# Patient Record
Sex: FEMALE | Race: BLACK OR AFRICAN AMERICAN | NOT HISPANIC OR LATINO | Employment: OTHER | URBAN - METROPOLITAN AREA
[De-identification: names, ages, dates, MRNs, and addresses within clinical notes are randomized per-mention and may not be internally consistent; named-entity substitution may affect disease eponyms.]

---

## 2018-08-22 ENCOUNTER — HISTORICAL (OUTPATIENT)
Dept: LAB | Facility: HOSPITAL | Age: 53
End: 2018-08-22

## 2018-11-06 ENCOUNTER — HISTORICAL (OUTPATIENT)
Dept: RADIOLOGY | Facility: HOSPITAL | Age: 53
End: 2018-11-06

## 2018-11-13 ENCOUNTER — HISTORICAL (OUTPATIENT)
Dept: RADIOLOGY | Facility: HOSPITAL | Age: 53
End: 2018-11-13

## 2018-11-15 ENCOUNTER — HISTORICAL (OUTPATIENT)
Dept: RADIOLOGY | Facility: HOSPITAL | Age: 53
End: 2018-11-15

## 2018-11-26 ENCOUNTER — HISTORICAL (OUTPATIENT)
Dept: LAB | Facility: HOSPITAL | Age: 53
End: 2018-11-26

## 2018-11-26 LAB — CEA SERPL-MCNC: 0.8 NG/ML (ref 0–3)

## 2018-12-07 LAB
ABS NEUT (OLG): 2 X10(3)/MCL (ref 1.5–6.9)
ALBUMIN SERPL-MCNC: 3.7 GM/DL (ref 3.4–5)
ALBUMIN/GLOB SERPL: 0.9 RATIO
ALP SERPL-CCNC: 74 UNIT/L (ref 30–113)
ALT SERPL-CCNC: 22 UNIT/L (ref 10–45)
APTT PPP: 31.1 SECOND(S) (ref 25–35)
AST SERPL-CCNC: 12 UNIT/L (ref 15–37)
BASOPHILS # BLD AUTO: 0 X10(3)/MCL (ref 0–0.1)
BASOPHILS NFR BLD AUTO: 1 % (ref 0–1)
BILIRUB SERPL-MCNC: 0.5 MG/DL (ref 0.1–0.9)
BILIRUBIN DIRECT+TOT PNL SERPL-MCNC: 0.1 MG/DL (ref 0–0.3)
BILIRUBIN DIRECT+TOT PNL SERPL-MCNC: 0.4 MG/DL
BUN SERPL-MCNC: 21 MG/DL (ref 10–20)
CALCIUM SERPL-MCNC: 9.3 MG/DL (ref 8–10.5)
CHLORIDE SERPL-SCNC: 105 MMOL/L (ref 100–108)
CO2 SERPL-SCNC: 33 MMOL/L (ref 21–35)
COLOR STL: NORMAL
CONSISTENCY STL: NORMAL
CREAT SERPL-MCNC: 0.68 MG/DL (ref 0.7–1.3)
EOSINOPHIL # BLD AUTO: 0.1 X10(3)/MCL (ref 0–0.6)
EOSINOPHIL NFR BLD AUTO: 3 % (ref 0–5)
ERYTHROCYTE [DISTWIDTH] IN BLOOD BY AUTOMATED COUNT: 13.4 % (ref 11.5–17)
GLOBULIN SER-MCNC: 4.2 GM/DL
GLUCOSE SERPL-MCNC: 88 MG/DL (ref 75–116)
HCT VFR BLD AUTO: 38.6 % (ref 36–48)
HEMOCCULT SP1 STL QL: NEGATIVE
HEMOCCULT SP2 STL QL: NEGATIVE
HGB BLD-MCNC: 12 GM/DL (ref 12–16)
INR PPP: 1 (ref 0–1.2)
LYMPHOCYTES # BLD AUTO: 1.7 X10(3)/MCL (ref 0.5–4.1)
LYMPHOCYTES NFR BLD AUTO: 40 % (ref 15–40)
MCH RBC QN AUTO: 28 PG (ref 27–34)
MCHC RBC AUTO-ENTMCNC: 31 GM/DL (ref 31–36)
MCV RBC AUTO: 90 FL (ref 80–99)
MONOCYTES # BLD AUTO: 0.4 X10(3)/MCL (ref 0–1.1)
MONOCYTES NFR BLD AUTO: 8 % (ref 4–12)
NEUTROPHILS # BLD AUTO: 2 X10(3)/MCL (ref 1.5–6.9)
NEUTROPHILS NFR BLD AUTO: 48 % (ref 43–75)
PLATELET # BLD AUTO: 239 X10(3)/MCL (ref 140–400)
PMV BLD AUTO: 11 FL (ref 6.8–10)
POTASSIUM SERPL-SCNC: 4.6 MMOL/L (ref 3.6–5.2)
PROT SERPL-MCNC: 7.9 GM/DL (ref 6.4–8.2)
PROTHROMBIN TIME: 10.3 SECOND(S) (ref 9–12)
RBC # BLD AUTO: 4.29 X10(6)/MCL (ref 4.2–5.4)
SODIUM SERPL-SCNC: 142 MMOL/L (ref 135–145)
WBC # SPEC AUTO: 4.3 X10(3)/MCL (ref 4.5–11.5)

## 2018-12-10 ENCOUNTER — HISTORICAL (OUTPATIENT)
Dept: ANESTHESIOLOGY | Facility: HOSPITAL | Age: 53
End: 2018-12-10

## 2019-09-22 LAB — HEMOCCULT SP1 STL QL: NEGATIVE

## 2020-06-08 ENCOUNTER — HISTORICAL (OUTPATIENT)
Dept: LAB | Facility: HOSPITAL | Age: 55
End: 2020-06-08

## 2020-06-08 LAB
ABS NEUT (OLG): 1 X10(3)/MCL (ref 1.5–6.9)
ALBUMIN SERPL-MCNC: 3.9 GM/DL (ref 3.5–5)
ALBUMIN/GLOB SERPL: 1 RATIO (ref 1.1–2)
ALP SERPL-CCNC: 66 UNIT/L (ref 40–150)
ALT SERPL-CCNC: 12 UNIT/L (ref 0–55)
AST SERPL-CCNC: 21 UNIT/L (ref 5–34)
BASOPHILS NFR BLD MANUAL: 0 % (ref 0–1)
BILIRUB SERPL-MCNC: 0.4 MG/DL
BILIRUBIN DIRECT+TOT PNL SERPL-MCNC: 0.2 MG/DL (ref 0–0.5)
BILIRUBIN DIRECT+TOT PNL SERPL-MCNC: 0.2 MG/DL (ref 0–0.8)
BUN SERPL-MCNC: 20 MG/DL (ref 9.8–20.1)
CALCIUM SERPL-MCNC: 9.3 MG/DL (ref 8.4–10.2)
CHLORIDE SERPL-SCNC: 104 MMOL/L (ref 98–107)
CHOLEST SERPL-MCNC: 138 MG/DL
CHOLEST/HDLC SERPL: 2 {RATIO} (ref 0–5)
CO2 SERPL-SCNC: 30 MMOL/L (ref 22–29)
CREAT SERPL-MCNC: 0.98 MG/DL (ref 0.55–1.02)
CRP SERPL-MCNC: 0.5 MG/DL
DEPRECATED CALCIDIOL+CALCIFEROL SERPL-MC: 39.4 NG/ML (ref 6.6–49.9)
EOSINOPHIL NFR BLD MANUAL: 0 % (ref 0–5)
ERYTHROCYTE [DISTWIDTH] IN BLOOD BY AUTOMATED COUNT: 12.9 % (ref 11.5–17)
GLOBULIN SER-MCNC: 3.8 GM/DL (ref 2.4–3.5)
GLUCOSE SERPL-MCNC: 107 MG/DL (ref 74–100)
GRANULOCYTES NFR BLD MANUAL: 32 % (ref 47–80)
HCT VFR BLD AUTO: 39.6 % (ref 36–48)
HDLC SERPL-MCNC: 56 MG/DL (ref 35–60)
HGB BLD-MCNC: 12.6 GM/DL (ref 12–16)
LDLC SERPL CALC-MCNC: 75 MG/DL (ref 50–140)
LYMPHOCYTES NFR BLD MANUAL: 52 % (ref 15–40)
MCH RBC QN AUTO: 28 PG (ref 27–34)
MCHC RBC AUTO-ENTMCNC: 32 GM/DL (ref 31–36)
MCV RBC AUTO: 88 FL (ref 80–99)
MONOCYTES NFR BLD MANUAL: 16 % (ref 4–12)
PLATELET # BLD AUTO: 189 X10(3)/MCL (ref 140–400)
PLATELET # BLD EST: ADEQUATE 10*3/UL
PMV BLD AUTO: 10 FL (ref 6.8–10)
POTASSIUM SERPL-SCNC: 3.5 MMOL/L (ref 3.5–5.1)
PROT SERPL-MCNC: 7.7 GM/DL (ref 6.4–8.3)
RBC # BLD AUTO: 4.48 X10(6)/MCL (ref 4.2–5.4)
RBC MORPH BLD: NORMAL
SODIUM SERPL-SCNC: 142 MMOL/L (ref 136–145)
TRIGL SERPL-MCNC: 35 MG/DL (ref 37–140)
TSH SERPL-ACNC: 3.71 UIU/ML (ref 0.35–4.94)
VLDLC SERPL CALC-MCNC: 7 MG/DL
WBC # SPEC AUTO: 2.9 X10(3)/MCL (ref 4.5–11.5)

## 2020-06-26 ENCOUNTER — HISTORICAL (OUTPATIENT)
Dept: LAB | Facility: HOSPITAL | Age: 55
End: 2020-06-26

## 2022-01-07 ENCOUNTER — HISTORICAL (OUTPATIENT)
Dept: RADIOLOGY | Facility: HOSPITAL | Age: 57
End: 2022-01-07

## 2022-01-07 LAB
ABS NEUT (OLG): 1.9 X10(3)/MCL (ref 1.5–6.9)
ALBUMIN SERPL-MCNC: 4 GM/DL (ref 3.5–5)
ALBUMIN/GLOB SERPL: 1.2 RATIO (ref 1.1–2)
ALP SERPL-CCNC: 67 UNIT/L (ref 40–150)
ALT SERPL-CCNC: 16 UNIT/L (ref 0–55)
APPEARANCE, UA: CLEAR
AST SERPL-CCNC: 15 UNIT/L (ref 5–34)
BILIRUB SERPL-MCNC: 0.6 MG/DL
BILIRUB UR QL STRIP: NEGATIVE
BILIRUBIN DIRECT+TOT PNL SERPL-MCNC: 0.3 MG/DL (ref 0–0.5)
BILIRUBIN DIRECT+TOT PNL SERPL-MCNC: 0.3 MG/DL (ref 0–0.8)
BUN SERPL-MCNC: 12 MG/DL (ref 9.8–20.1)
CALCIUM SERPL-MCNC: 9.9 MG/DL (ref 8.7–10.5)
CHLORIDE SERPL-SCNC: 105 MMOL/L (ref 98–107)
CHOLEST SERPL-MCNC: 145 MG/DL
CHOLEST/HDLC SERPL: 3 {RATIO} (ref 0–5)
CO2 SERPL-SCNC: 29 MMOL/L (ref 22–29)
COLOR UR: YELLOW
CREAT SERPL-MCNC: 0.81 MG/DL (ref 0.55–1.02)
DEPRECATED CALCIDIOL+CALCIFEROL SERPL-MC: 36.3 NG/ML (ref 30–80)
ERYTHROCYTE [DISTWIDTH] IN BLOOD BY AUTOMATED COUNT: 13.1 % (ref 11.5–17)
GLOBULIN SER-MCNC: 3.3 GM/DL (ref 2.4–3.5)
GLUCOSE (UA): NEGATIVE MG/DL
GLUCOSE SERPL-MCNC: 92 MG/DL (ref 74–100)
HCT VFR BLD AUTO: 35.4 % (ref 36–48)
HDLC SERPL-MCNC: 56 MG/DL (ref 35–60)
HGB BLD-MCNC: 11.3 GM/DL (ref 12–16)
HGB UR QL STRIP: NEGATIVE
KETONES UR QL STRIP: NEGATIVE MG/DL
LDLC SERPL CALC-MCNC: 83 MG/DL (ref 50–140)
LEUKOCYTE ESTERASE UR QL STRIP: NEGATIVE
MCH RBC QN AUTO: 28 PG (ref 27–34)
MCHC RBC AUTO-ENTMCNC: 32 GM/DL (ref 31–36)
MCV RBC AUTO: 86 FL (ref 80–99)
NITRITE UR QL STRIP: NEGATIVE
PH UR STRIP: 6.5 [PH] (ref 4.6–8)
PLATELET # BLD AUTO: 279 X10(3)/MCL (ref 140–400)
PMV BLD AUTO: 10.2 FL (ref 6.8–10)
POTASSIUM SERPL-SCNC: 5.1 MMOL/L (ref 3.5–5.1)
PROT SERPL-MCNC: 7.3 GM/DL (ref 6.4–8.3)
PROT UR QL STRIP: NEGATIVE MG/DL
RBC # BLD AUTO: 4.1 X10(6)/MCL (ref 4.2–5.4)
SODIUM SERPL-SCNC: 143 MMOL/L (ref 136–145)
SP GR UR STRIP: 1.01 (ref 1–1.03)
TRIGL SERPL-MCNC: 28 MG/DL (ref 37–140)
TSH SERPL-ACNC: 0.94 UIU/ML (ref 0.35–4.94)
UROBILINOGEN UR STRIP-ACNC: 0.2 EU/DL
VLDLC SERPL CALC-MCNC: 6 MG/DL
WBC # SPEC AUTO: 4.4 X10(3)/MCL (ref 4.5–11.5)

## 2022-04-30 NOTE — OP NOTE
ADMITTING DIAGNOSIS:  Midepigastric abdominal pain, etiology unclear.    PROCEDURE:  Esophagogastroduodenoscopy with biopsy of the antrum.    POSTOPERATIVE DIAGNOSES:    1. Normal duodenum in all 4 portions and into the jejunum.  2. Normal antrum, fundus, and cardia of the stomach.  3. Less than 0.5 cm hiatal hernia, Z-line at 38 cm.  4. Normal esophagus, cricopharyngeus muscle, vocal cords.    PLAN:    1. CT of the abdomen and pelvis for a history of colon cancer and midepigastric abdominal pain.  2. Check a CEA level.  3. Check colonoscopy pending above.  4. Consideration for laparoscopic cholecystectomy based upon symptomatology.    INDICATIONS FOR PROCEDURE:  Patient is a 53-year-old  female with a history of migraines, anxiety, depressive disorders, and has colon cancer with a history of reflux.  Patient had her colon cancer resected for obstruction in 2002, had a colostomy and then had reversal of the colostomy.  She received no radiation.  No chemo.  She was a stage II disease.  The patient then had a ventral hernia repair after her colon resection twice.  She has had EGD in 2015 by Dr. Campos and colonoscopy in 2015 as well.  Patient was scheduled recently for endoscopy because of midepigastric abdominal pain.    DESCRIPTION OF PROCEDURE:  She underwent sedation and examination of esophagus, stomach, and duodenum.  Duodenum was normal in all 4 portions and into the jejunum.  The pylorus was intubated without difficulty.  Antrum, fundus, and cardia of the stomach were normal.  Patient did have a Z-line at 38 cm with less than a 0.5 cm hiatal hernia.  Esophagus, cricopharyngeus muscle, and vocal cords were normal throughout.  No other abnormalities were seen.       I appreciate the consultation referral from her nurse practitioner, Ms Yakelin Veliz, and will notify her of my findings.        APOLLO/GRAHAM   DD: 12/10/2018 1159   DT: 12/10/2018 1228  Job # [id number]    cc: Ms  Yakelin Veliz

## 2022-07-28 ENCOUNTER — HOSPITAL ENCOUNTER (EMERGENCY)
Facility: HOSPITAL | Age: 57
Discharge: HOME | End: 2022-07-28
Attending: GENERAL ACUTE CARE HOSPITAL
Payer: COMMERCIAL

## 2022-07-28 VITALS
RESPIRATION RATE: 20 BRPM | SYSTOLIC BLOOD PRESSURE: 119 MMHG | OXYGEN SATURATION: 99 % | DIASTOLIC BLOOD PRESSURE: 72 MMHG | HEART RATE: 71 BPM | TEMPERATURE: 99 F | WEIGHT: 149.19 LBS | BODY MASS INDEX: 24.86 KG/M2 | HEIGHT: 65 IN

## 2022-07-28 DIAGNOSIS — R42 DIZZINESS: ICD-10-CM

## 2022-07-28 DIAGNOSIS — R51.9 HEADACHE, UNSPECIFIED HEADACHE TYPE: Primary | ICD-10-CM

## 2022-07-28 DIAGNOSIS — R07.81 PLEURITIC PAIN: ICD-10-CM

## 2022-07-28 DIAGNOSIS — I10 HYPERTENSION, UNSPECIFIED TYPE: ICD-10-CM

## 2022-07-28 DIAGNOSIS — N30.00 ACUTE CYSTITIS WITHOUT HEMATURIA: ICD-10-CM

## 2022-07-28 PROBLEM — C18.9 STAGE III CARCINOMA OF COLON: Status: ACTIVE | Noted: 2022-07-28

## 2022-07-28 PROBLEM — G43.909 MIGRAINE HEADACHE: Status: ACTIVE | Noted: 2022-07-28

## 2022-07-28 PROBLEM — F32.A DEPRESSIVE DISORDER: Status: ACTIVE | Noted: 2022-07-28

## 2022-07-28 PROBLEM — K42.9 UMBILICAL HERNIA: Status: ACTIVE | Noted: 2022-07-28

## 2022-07-28 PROBLEM — R10.9 ABDOMINAL PAIN: Status: ACTIVE | Noted: 2022-07-28

## 2022-07-28 PROBLEM — K21.9 GASTROESOPHAGEAL REFLUX DISEASE: Status: ACTIVE | Noted: 2022-07-28

## 2022-07-28 PROBLEM — F41.9 ANXIETY: Status: ACTIVE | Noted: 2022-07-28

## 2022-07-28 LAB
ALBUMIN SERPL-MCNC: 3.8 GM/DL (ref 3.5–5)
ALBUMIN/GLOB SERPL: 1.2 RATIO (ref 1.1–2)
ALP SERPL-CCNC: 63 UNIT/L (ref 40–150)
ALT SERPL-CCNC: 11 UNIT/L (ref 0–55)
AMPHET UR QL SCN: NEGATIVE
APPEARANCE UR: CLEAR
AST SERPL-CCNC: 14 UNIT/L (ref 5–34)
BACTERIA #/AREA URNS AUTO: NORMAL /HPF
BARBITURATE SCN PRESENT UR: NEGATIVE
BASOPHILS # BLD AUTO: 0.04 X10(3)/MCL (ref 0–0.2)
BASOPHILS NFR BLD AUTO: 0.7 %
BENZODIAZ UR QL SCN: NEGATIVE
BILIRUB UR QL STRIP.AUTO: NEGATIVE MG/DL
BILIRUBIN DIRECT+TOT PNL SERPL-MCNC: 0.4 MG/DL
BNP BLD-MCNC: 10.9 PG/ML
BUN SERPL-MCNC: 20 MG/DL (ref 9.8–20.1)
CALCIUM SERPL-MCNC: 9.6 MG/DL (ref 8.4–10.2)
CANNABINOIDS UR QL SCN: NEGATIVE
CHLORIDE SERPL-SCNC: 108 MMOL/L (ref 98–107)
CO2 SERPL-SCNC: 26 MMOL/L (ref 22–29)
COCAINE UR QL SCN: NEGATIVE
COLOR UR AUTO: YELLOW
CREAT SERPL-MCNC: 0.72 MG/DL (ref 0.55–1.02)
EOSINOPHIL # BLD AUTO: 0.14 X10(3)/MCL (ref 0–0.9)
EOSINOPHIL NFR BLD AUTO: 2.6 %
ERYTHROCYTE [DISTWIDTH] IN BLOOD BY AUTOMATED COUNT: 13.8 % (ref 11.5–17)
FENTANYL UR QL SCN: NEGATIVE
FLUAV AG UPPER RESP QL IA.RAPID: NOT DETECTED
FLUBV AG UPPER RESP QL IA.RAPID: NOT DETECTED
GLOBULIN SER-MCNC: 3.3 GM/DL (ref 2.4–3.5)
GLUCOSE SERPL-MCNC: 100 MG/DL (ref 74–100)
GLUCOSE UR QL STRIP.AUTO: NEGATIVE MG/DL
HCT VFR BLD AUTO: 34.7 % (ref 37–47)
HGB BLD-MCNC: 10.8 GM/DL (ref 12–16)
IMM GRANULOCYTES # BLD AUTO: 0.01 X10(3)/MCL (ref 0–0.04)
IMM GRANULOCYTES NFR BLD AUTO: 0.2 %
KETONES UR QL STRIP.AUTO: NEGATIVE MG/DL
LEUKOCYTE ESTERASE UR QL STRIP.AUTO: ABNORMAL UNIT/L
LIPASE SERPL-CCNC: 13 U/L
LYMPHOCYTES # BLD AUTO: 2.28 X10(3)/MCL (ref 0.6–4.6)
LYMPHOCYTES NFR BLD AUTO: 42.1 %
MAGNESIUM SERPL-MCNC: 1.9 MG/DL (ref 1.6–2.6)
MCH RBC QN AUTO: 28.1 PG (ref 27–31)
MCHC RBC AUTO-ENTMCNC: 31.1 MG/DL (ref 33–36)
MCV RBC AUTO: 90.1 FL (ref 80–94)
MDMA UR QL SCN: NEGATIVE
MONOCYTES # BLD AUTO: 0.46 X10(3)/MCL (ref 0.1–1.3)
MONOCYTES NFR BLD AUTO: 8.5 %
NEUTROPHILS # BLD AUTO: 2.5 X10(3)/MCL (ref 2.1–9.2)
NEUTROPHILS NFR BLD AUTO: 45.9 %
NITRITE UR QL STRIP.AUTO: NEGATIVE
OPIATES UR QL SCN: NEGATIVE
PCP UR QL: NEGATIVE
PH UR STRIP.AUTO: 7 [PH]
PH UR: 7 [PH] (ref 3–11)
PLATELET # BLD AUTO: 229 X10(3)/MCL (ref 130–400)
PMV BLD AUTO: 10 FL (ref 7.4–10.4)
POTASSIUM SERPL-SCNC: 4.4 MMOL/L (ref 3.5–5.1)
PROT SERPL-MCNC: 7.1 GM/DL (ref 6.4–8.3)
PROT UR QL STRIP.AUTO: NEGATIVE MG/DL
RBC # BLD AUTO: 3.85 X10(6)/MCL (ref 4.2–5.4)
RBC #/AREA URNS AUTO: NORMAL /HPF
RBC UR QL AUTO: ABNORMAL UNIT/L
SARS-COV-2 RDRP RESP QL NAA+PROBE: NEGATIVE
SARS-COV-2 RNA RESP QL NAA+PROBE: NOT DETECTED
SODIUM SERPL-SCNC: 144 MMOL/L (ref 136–145)
SP GR UR STRIP.AUTO: 1.02
SPECIFIC GRAVITY, URINE AUTO (.000) (OHS): 1.02 (ref 1–1.03)
SQUAMOUS #/AREA URNS AUTO: NORMAL /HPF
UROBILINOGEN UR STRIP-ACNC: 0.2 MG/DL
WBC # SPEC AUTO: 5.4 X10(3)/MCL (ref 4.5–11.5)
WBC #/AREA URNS AUTO: NORMAL /HPF

## 2022-07-28 PROCEDURE — 80053 COMPREHEN METABOLIC PANEL: CPT | Performed by: GENERAL ACUTE CARE HOSPITAL

## 2022-07-28 PROCEDURE — 87635 SARS-COV-2 COVID-19 AMP PRB: CPT | Performed by: EMERGENCY MEDICINE

## 2022-07-28 PROCEDURE — 63600175 PHARM REV CODE 636 W HCPCS: Performed by: NURSE PRACTITIONER

## 2022-07-28 PROCEDURE — 36415 COLL VENOUS BLD VENIPUNCTURE: CPT | Performed by: GENERAL ACUTE CARE HOSPITAL

## 2022-07-28 PROCEDURE — 83735 ASSAY OF MAGNESIUM: CPT | Performed by: GENERAL ACUTE CARE HOSPITAL

## 2022-07-28 PROCEDURE — 85025 COMPLETE CBC W/AUTO DIFF WBC: CPT | Performed by: GENERAL ACUTE CARE HOSPITAL

## 2022-07-28 PROCEDURE — 96374 THER/PROPH/DIAG INJ IV PUSH: CPT

## 2022-07-28 PROCEDURE — 83880 ASSAY OF NATRIURETIC PEPTIDE: CPT | Performed by: GENERAL ACUTE CARE HOSPITAL

## 2022-07-28 PROCEDURE — 96375 TX/PRO/DX INJ NEW DRUG ADDON: CPT

## 2022-07-28 PROCEDURE — 93005 ELECTROCARDIOGRAM TRACING: CPT

## 2022-07-28 PROCEDURE — 83690 ASSAY OF LIPASE: CPT | Performed by: GENERAL ACUTE CARE HOSPITAL

## 2022-07-28 PROCEDURE — 80307 DRUG TEST PRSMV CHEM ANLYZR: CPT | Performed by: GENERAL ACUTE CARE HOSPITAL

## 2022-07-28 PROCEDURE — 81001 URINALYSIS AUTO W/SCOPE: CPT | Mod: 59 | Performed by: GENERAL ACUTE CARE HOSPITAL

## 2022-07-28 PROCEDURE — 87636 SARSCOV2 & INF A&B AMP PRB: CPT | Performed by: GENERAL ACUTE CARE HOSPITAL

## 2022-07-28 PROCEDURE — 96361 HYDRATE IV INFUSION ADD-ON: CPT

## 2022-07-28 PROCEDURE — 25000003 PHARM REV CODE 250: Performed by: GENERAL ACUTE CARE HOSPITAL

## 2022-07-28 PROCEDURE — 25000003 PHARM REV CODE 250: Performed by: NURSE PRACTITIONER

## 2022-07-28 PROCEDURE — 99285 EMERGENCY DEPT VISIT HI MDM: CPT | Mod: 25

## 2022-07-28 RX ORDER — AMITRIPTYLINE HYDROCHLORIDE 10 MG/1
10 TABLET, FILM COATED ORAL NIGHTLY
COMMUNITY
Start: 2022-01-12

## 2022-07-28 RX ORDER — LISINOPRIL 10 MG/1
20 TABLET ORAL DAILY
Status: DISCONTINUED | OUTPATIENT
Start: 2022-07-28 | End: 2022-07-28 | Stop reason: HOSPADM

## 2022-07-28 RX ORDER — NYSTATIN 100000 [USP'U]/ML
10 SUSPENSION ORAL 4 TIMES DAILY
COMMUNITY
Start: 2022-06-28

## 2022-07-28 RX ORDER — ORPHENADRINE CITRATE 30 MG/ML
60 INJECTION INTRAMUSCULAR; INTRAVENOUS
Status: COMPLETED | OUTPATIENT
Start: 2022-07-28 | End: 2022-07-28

## 2022-07-28 RX ORDER — MELOXICAM 15 MG/1
15 TABLET ORAL DAILY
Qty: 10 TABLET | Refills: 0 | Status: SHIPPED | OUTPATIENT
Start: 2022-07-28 | End: 2022-08-07

## 2022-07-28 RX ORDER — KETOROLAC TROMETHAMINE 30 MG/ML
30 INJECTION, SOLUTION INTRAMUSCULAR; INTRAVENOUS
Status: COMPLETED | OUTPATIENT
Start: 2022-07-28 | End: 2022-07-28

## 2022-07-28 RX ORDER — NITROFURANTOIN 25; 75 MG/1; MG/1
100 CAPSULE ORAL 2 TIMES DAILY
Qty: 10 CAPSULE | Refills: 0 | Status: SHIPPED | OUTPATIENT
Start: 2022-07-28 | End: 2022-08-02

## 2022-07-28 RX ORDER — LISINOPRIL 10 MG/1
10 TABLET ORAL DAILY
Qty: 30 TABLET | Refills: 0 | Status: SHIPPED | OUTPATIENT
Start: 2022-07-28 | End: 2022-08-27

## 2022-07-28 RX ORDER — DEXAMETHASONE SODIUM PHOSPHATE 4 MG/ML
8 INJECTION, SOLUTION INTRA-ARTICULAR; INTRALESIONAL; INTRAMUSCULAR; INTRAVENOUS; SOFT TISSUE
Status: COMPLETED | OUTPATIENT
Start: 2022-07-28 | End: 2022-07-28

## 2022-07-28 RX ADMIN — ORPHENADRINE CITRATE 60 MG: 60 INJECTION INTRAMUSCULAR; INTRAVENOUS at 08:07

## 2022-07-28 RX ADMIN — DEXAMETHASONE SODIUM PHOSPHATE 8 MG: 4 INJECTION, SOLUTION INTRA-ARTICULAR; INTRALESIONAL; INTRAMUSCULAR; INTRAVENOUS; SOFT TISSUE at 08:07

## 2022-07-28 RX ADMIN — SODIUM CHLORIDE 500 ML: 9 INJECTION, SOLUTION INTRAVENOUS at 07:07

## 2022-07-28 RX ADMIN — KETOROLAC TROMETHAMINE 30 MG: 30 INJECTION, SOLUTION INTRAMUSCULAR; INTRAVENOUS at 08:07

## 2022-07-28 RX ADMIN — LISINOPRIL 20 MG: 10 TABLET ORAL at 08:07

## 2022-07-29 NOTE — ED PROVIDER NOTES
Encounter Date: 7/28/2022       History     Chief Complaint   Patient presents with    Headache     Pt c/o feeling lightheaded and having a headaches since last night. Pt states has burning in chest when she takes a deep breath     57-year-old female with significant history of colon cancer in 2004, history of migraine, presents to the emergency department for evaluation and treatment of what she is describing as pleuritic pain and a terrible headache.  Her pleurisy started last night, worse with deep inspiration, reproducible upon palpation, her headache she states occurs on a semi-regular basis, radiates into the back of her head, she came to the ER today for reports of some dizziness.  She denies any nausea, denies any vomiting, denies any fever, denies any shortness of breath, she states that she has not had a colonoscopy in at least 4 years, she denies any other associated symptoms.        Review of patient's allergies indicates:   Allergen Reactions    Codeine Itching    Latex, natural rubber      History reviewed. No pertinent past medical history.  History reviewed. No pertinent surgical history.  History reviewed. No pertinent family history.     Review of Systems   All other systems reviewed and are negative.      Physical Exam     Initial Vitals [07/28/22 1650]   BP Pulse Resp Temp SpO2   133/84 67 18 98.6 °F (37 °C) 98 %      MAP       --         Physical Exam    Nursing note and vitals reviewed.  Constitutional: She appears well-developed and well-nourished.   HENT:   Head: Normocephalic and atraumatic.   Eyes: Conjunctivae are normal.   Neck: Neck supple.   Cardiovascular: Normal rate, regular rhythm and normal heart sounds.   Pulmonary/Chest: Effort normal and breath sounds normal. No accessory muscle usage. No respiratory distress. She exhibits tenderness. She exhibits no deformity and no retraction.   Pain reproduced upon palpation, CTA with full excursion   Abdominal: Abdomen is soft.    Musculoskeletal:         General: Normal range of motion.      Cervical back: Neck supple.     Neurological: She is alert and oriented to person, place, and time. She has normal strength.   Skin: Skin is warm and dry.   Psychiatric: She has a normal mood and affect. Her behavior is normal. Judgment and thought content normal.         ED Course   Procedures  Labs Reviewed   COMPREHENSIVE METABOLIC PANEL - Abnormal; Notable for the following components:       Result Value    Chloride 108 (*)     All other components within normal limits   URINALYSIS, REFLEX TO URINE CULTURE - Abnormal; Notable for the following components:    Blood, UA Trace-Intact (*)     Leukocyte Esterase, UA Trace (*)     All other components within normal limits   CBC WITH DIFFERENTIAL - Abnormal; Notable for the following components:    RBC 3.85 (*)     Hgb 10.8 (*)     Hct 34.7 (*)     MCHC 31.1 (*)     All other components within normal limits   SARS-COV-2 RNA AMPLIFICATION, QUAL - Normal   B-TYPE NATRIURETIC PEPTIDE - Normal   COVID/FLU A&B PCR - Normal   DRUG SCREEN, URINE (BEAKER) - Normal    Narrative:     Cut off concentrations:    Amphetamines - 1000 ng/ml  Barbiturates - 200 ng/ml  Benzodiazepine - 200 ng/ml  Cannabinoids (THC) - 50 ng/ml  Cocaine - 300 ng/ml  Fentanyl - 1.0 ng/ml  MDMA - 500 ng/ml  Opiates - 300 ng/ml   Phencyclidine (PCP) - 25 ng/ml    Specimen submitted for drug analysis and tested for pH and specific gravity in order to evaluate sample integrity. Suspect tampering if specific gravity is <1.003 and/or pH is not within the range of 4.5 - 8.0  False negatives may result form substances such as bleach added to urine.  False positives may result for the presence of a substance with similar chemical structure to the drug or its metabolite.    This test provides only a PRELIMINARY analytical test result. A more specific alternate chemical method must be used in order to obtain a confirmed analytical result. Gas  chromatography/mass spectrometry (GC/MS) is the preferred confirmatory method. Other chemical confirmation methods are available. Clinical consideration and professional judgement should be applied to any drug of abuse test result, particularly when preliminary positive results are used.    Positive results will be confirmed only at the physicians request. Unconfirmed screening results are to be used only for medical purposes (treatment).        LIPASE - Normal   MAGNESIUM - Normal   URINALYSIS, MICROSCOPIC - Normal   CBC W/ AUTO DIFFERENTIAL    Narrative:     The following orders were created for panel order CBC auto differential.  Procedure                               Abnormality         Status                     ---------                               -----------         ------                     CBC with Differential[[id number]]        Abnormal            Final result                 Please view results for these tests on the individual orders.          Imaging Results          CT Head Without Contrast (Preliminary result)  Result time 07/28/22 19:09:13    Preliminary result by Interface, Rad Results In (07/28/22 19:09:13)                 Narrative:    START OF REPORT:  Technique: CT of the head was performed without intravenous contrast with axial as well as coronal and sagittal images.    Comparison: None.    Dosage Information: Automated exposure control was utilized.    Clinical history: Headache.    Findings:  Hemorrhage: No acute intracranial hemorrhage is seen.  CSF spaces: The ventricles sulci and basal cisterns are within normal limits for age.  Brain parenchyma: Unremarkable with preservation of the grey white junction throughout. No acute infarct.  Cerebellum: Unremarkable.  Vascular: Mild atheromatous calcification of the intracranial arteries is seen.  Sella and skull base: The sella appears to be within normal limits for age.  Cerebellopontine angles: Within normal limits.  Intracranial  calcifications: Incidental note is made of bilateral choroid plexus calcification. Incidental note is made of some pineal region calcification. Incidental note is made of some calcification of the falx. Incidental note is made of subtle basal ganglia calcifcation.  Calvarium: No acute linear or depressed skull fracture is seen.    Maxillofacial Structures:  Paranasal sinuses: The visualized paranasal sinuses appear clear with no definitive mucoperiosteal thickening or air fluid levels identified.  Orbits: The orbits appear unremarkable.  Zygomatic arches: The zygomatic arches are intact and unremarkable.  Temporal bones and mastoids: The temporal bones and mastoids appear unremarkable.  TMJ: The mandibular condyles appear normally placed with respect to the mandibular fossa.  Nasal Bones: The nasal septum is midline.    Visualized upper cervical spine: The visualized cervical spine appears unremarkable.      Impression:  1. No acute intracranial process identified. Details and other findings as noted above.                      Preliminary result by Chris Rice MD (07/28/22 19:09:13)                 Narrative:    START OF REPORT:  Technique: CT of the head was performed without intravenous contrast with axial as well as coronal and sagittal images.    Comparison: None.    Dosage Information: Automated exposure control was utilized.    Clinical history: Headache.    Findings:  Hemorrhage: No acute intracranial hemorrhage is seen.  CSF spaces: The ventricles sulci and basal cisterns are within normal limits for age.  Brain parenchyma: Unremarkable with preservation of the grey white junction throughout. No acute infarct.  Cerebellum: Unremarkable.  Vascular: Mild atheromatous calcification of the intracranial arteries is seen.  Sella and skull base: The sella appears to be within normal limits for age.  Cerebellopontine angles: Within normal limits.  Intracranial calcifications: Incidental note is made of  bilateral choroid plexus calcification. Incidental note is made of some pineal region calcification. Incidental note is made of some calcification of the falx. Incidental note is made of subtle basal ganglia calcifcation.  Calvarium: No acute linear or depressed skull fracture is seen.    Maxillofacial Structures:  Paranasal sinuses: The visualized paranasal sinuses appear clear with no definitive mucoperiosteal thickening or air fluid levels identified.  Orbits: The orbits appear unremarkable.  Zygomatic arches: The zygomatic arches are intact and unremarkable.  Temporal bones and mastoids: The temporal bones and mastoids appear unremarkable.  TMJ: The mandibular condyles appear normally placed with respect to the mandibular fossa.  Nasal Bones: The nasal septum is midline.    Visualized upper cervical spine: The visualized cervical spine appears unremarkable.      Impression:  1. No acute intracranial process identified. Details and other findings as noted above.                                   Medications   sodium chloride 0.9% bolus 500 mL (500 mLs Intravenous New Bag 7/28/22 1926)   ketorolac injection 30 mg (has no administration in time range)   orphenadrine injection 60 mg (has no administration in time range)   dexamethasone injection 8 mg (has no administration in time range)   lisinopriL tablet 20 mg (has no administration in time range)                 ED Course as of 07/28/22 2018   Thu Jul 28, 2022 2004 Workup essentially negative, UDS negative, vital signs stable, Slightly anemic, CMP WNL, EKG sinus bradycardia at 54, no acute findings, no ectopy, no ST depression, no ST elevation, CT head clear as there was a concern for possible colon ca mets due to reported frequency of headaches, slight leukocytes in UA for which she will be treated at this time.  She did not tilt when orthostatics were performed.  Resting in bed comfortably able to speak in complete sentences, NAD [EB]   2007 Her results were  discussed with the patient in detail and all questions were answered.  She states that she is currently under a lot of stress and she feels like the stress contributes to her condition.  She will follow up with her PCP for repeat colonoscopy as it has been many years.  She was encouraged to stay hydrated and take her medications as prescribed.  She is here with her mother who states that her pressure systolic is elevated at times and the patient states that she feels like she needs some htn medication until she can get into her PCP office.  TX for uti as discussed.  She will return to the ED if the need arises. [EB]      ED Course User Index  [EB] ROMINA Almanza             Clinical Impression:   Final diagnoses:  [R42] Dizziness  [R51.9] Headache, unspecified headache type (Primary)  [R07.81] Pleuritic pain  [N30.00] Acute cystitis without hematuria  [I10] Hypertension, unspecified type          ED Disposition Condition    Discharge Stable        ED Prescriptions     Medication Sig Dispense Start Date End Date Auth. Provider    lisinopriL 10 MG tablet Take 1 tablet (10 mg total) by mouth once daily. 30 tablet 7/28/2022 8/27/2022 ROMINA Almanza    nitrofurantoin, macrocrystal-monohydrate, (MACROBID) 100 MG capsule Take 1 capsule (100 mg total) by mouth 2 (two) times daily. for 5 days 10 capsule 7/28/2022 8/2/2022 ROMINA Almanza    meloxicam (MOBIC) 15 MG tablet Take 1 tablet (15 mg total) by mouth once daily. for 10 days 10 tablet 7/28/2022 8/7/2022 ROMINA Almanza        Follow-up Information     Follow up With Specialties Details Why Contact Info    Keisha Torres MD Family Medicine  If symptoms worsen, As needed [address] [place] [address]  [phone]             ROMINA Almanza  07/28/22 2018

## 2023-01-14 ENCOUNTER — DOCUMENTATION ONLY (OUTPATIENT)
Dept: ADMINISTRATIVE | Facility: HOSPITAL | Age: 58
End: 2023-01-14
Payer: MEDICAID

## 2023-02-07 ENCOUNTER — HOSPITAL ENCOUNTER (INPATIENT)
Facility: HOSPITAL | Age: 58
LOS: 2 days | Discharge: HOME | DRG: 390 | End: 2023-02-11
Attending: EMERGENCY MEDICINE | Admitting: INTERNAL MEDICINE
Payer: COMMERCIAL

## 2023-02-07 DIAGNOSIS — K56.609 SBO (SMALL BOWEL OBSTRUCTION): Primary | ICD-10-CM

## 2023-02-07 LAB
ALBUMIN SERPL-MCNC: 4 G/DL (ref 3.5–5)
ALBUMIN/GLOB SERPL: 1.1 RATIO (ref 1.1–2)
ALP SERPL-CCNC: 65 UNIT/L (ref 40–150)
ALT SERPL-CCNC: 10 UNIT/L (ref 0–55)
APPEARANCE UR: CLEAR
AST SERPL-CCNC: 13 UNIT/L (ref 5–34)
BASOPHILS # BLD AUTO: 0.03 X10(3)/MCL (ref 0–0.2)
BASOPHILS NFR BLD AUTO: 0.5 %
BILIRUB UR QL STRIP.AUTO: NEGATIVE MG/DL
BILIRUBIN DIRECT+TOT PNL SERPL-MCNC: 0.5 MG/DL
BUN SERPL-MCNC: 20 MG/DL (ref 9.8–20.1)
CALCIUM SERPL-MCNC: 9.9 MG/DL (ref 8.4–10.2)
CHLORIDE SERPL-SCNC: 106 MMOL/L (ref 98–107)
CO2 SERPL-SCNC: 29 MMOL/L (ref 22–29)
COLOR UR AUTO: YELLOW
CREAT SERPL-MCNC: 0.85 MG/DL (ref 0.55–1.02)
EOSINOPHIL # BLD AUTO: 0.12 X10(3)/MCL (ref 0–0.9)
EOSINOPHIL NFR BLD AUTO: 2 %
ERYTHROCYTE [DISTWIDTH] IN BLOOD BY AUTOMATED COUNT: 13.3 % (ref 11.5–17)
GFR SERPLBLD CREATININE-BSD FMLA CKD-EPI: >60 MLS/MIN/1.73/M2
GLOBULIN SER-MCNC: 3.5 GM/DL (ref 2.4–3.5)
GLUCOSE SERPL-MCNC: 99 MG/DL (ref 74–100)
GLUCOSE UR QL STRIP.AUTO: NEGATIVE MG/DL
HCT VFR BLD AUTO: 36.2 % (ref 37–47)
HGB BLD-MCNC: 11.4 GM/DL (ref 12–16)
IMM GRANULOCYTES # BLD AUTO: 0.01 X10(3)/MCL (ref 0–0.04)
IMM GRANULOCYTES NFR BLD AUTO: 0.2 %
KETONES UR QL STRIP.AUTO: ABNORMAL MG/DL
LACTATE SERPL-SCNC: 0.6 MMOL/L (ref 0.5–2.2)
LEUKOCYTE ESTERASE UR QL STRIP.AUTO: NEGATIVE UNIT/L
LIPASE SERPL-CCNC: 11 U/L
LYMPHOCYTES # BLD AUTO: 2.47 X10(3)/MCL (ref 0.6–4.6)
LYMPHOCYTES NFR BLD AUTO: 40.8 %
MCH RBC QN AUTO: 28 PG
MCHC RBC AUTO-ENTMCNC: 31.5 MG/DL (ref 33–36)
MCV RBC AUTO: 88.9 FL (ref 80–94)
MONOCYTES # BLD AUTO: 0.52 X10(3)/MCL (ref 0.1–1.3)
MONOCYTES NFR BLD AUTO: 8.6 %
NEUTROPHILS # BLD AUTO: 2.91 X10(3)/MCL (ref 2.1–9.2)
NEUTROPHILS NFR BLD AUTO: 47.9 %
NITRITE UR QL STRIP.AUTO: NEGATIVE
PH UR STRIP.AUTO: 6.5 [PH]
PLATELET # BLD AUTO: 230 X10(3)/MCL (ref 130–400)
PMV BLD AUTO: 9.9 FL (ref 7.4–10.4)
POTASSIUM SERPL-SCNC: 4.2 MMOL/L (ref 3.5–5.1)
PROT SERPL-MCNC: 7.5 GM/DL (ref 6.4–8.3)
PROT UR QL STRIP.AUTO: NEGATIVE MG/DL
RBC # BLD AUTO: 4.07 X10(6)/MCL (ref 4.2–5.4)
RBC UR QL AUTO: NEGATIVE UNIT/L
SODIUM SERPL-SCNC: 143 MMOL/L (ref 136–145)
SP GR UR STRIP.AUTO: 1.02
UROBILINOGEN UR STRIP-ACNC: 0.2 MG/DL
WBC # SPEC AUTO: 6.1 X10(3)/MCL (ref 4.5–11.5)

## 2023-02-07 PROCEDURE — 80053 COMPREHEN METABOLIC PANEL: CPT | Performed by: NURSE PRACTITIONER

## 2023-02-07 PROCEDURE — 96376 TX/PRO/DX INJ SAME DRUG ADON: CPT

## 2023-02-07 PROCEDURE — 25500020 PHARM REV CODE 255: Performed by: EMERGENCY MEDICINE

## 2023-02-07 PROCEDURE — 83690 ASSAY OF LIPASE: CPT | Performed by: NURSE PRACTITIONER

## 2023-02-07 PROCEDURE — 25000003 PHARM REV CODE 250: Performed by: EMERGENCY MEDICINE

## 2023-02-07 PROCEDURE — 96375 TX/PRO/DX INJ NEW DRUG ADDON: CPT

## 2023-02-07 PROCEDURE — 85025 COMPLETE CBC W/AUTO DIFF WBC: CPT | Performed by: NURSE PRACTITIONER

## 2023-02-07 PROCEDURE — G0378 HOSPITAL OBSERVATION PER HR: HCPCS

## 2023-02-07 PROCEDURE — 81003 URINALYSIS AUTO W/O SCOPE: CPT | Performed by: NURSE PRACTITIONER

## 2023-02-07 PROCEDURE — 99285 EMERGENCY DEPT VISIT HI MDM: CPT | Mod: 25

## 2023-02-07 PROCEDURE — 83605 ASSAY OF LACTIC ACID: CPT | Performed by: EMERGENCY MEDICINE

## 2023-02-07 PROCEDURE — 96365 THER/PROPH/DIAG IV INF INIT: CPT

## 2023-02-07 PROCEDURE — 63600175 PHARM REV CODE 636 W HCPCS: Performed by: EMERGENCY MEDICINE

## 2023-02-07 RX ORDER — SODIUM CHLORIDE, SODIUM LACTATE, POTASSIUM CHLORIDE, CALCIUM CHLORIDE 600; 310; 30; 20 MG/100ML; MG/100ML; MG/100ML; MG/100ML
1000 INJECTION, SOLUTION INTRAVENOUS
Status: COMPLETED | OUTPATIENT
Start: 2023-02-07 | End: 2023-02-07

## 2023-02-07 RX ORDER — ONDANSETRON 2 MG/ML
4 INJECTION INTRAMUSCULAR; INTRAVENOUS
Status: COMPLETED | OUTPATIENT
Start: 2023-02-07 | End: 2023-02-07

## 2023-02-07 RX ORDER — FENTANYL CITRATE 50 UG/ML
100 INJECTION, SOLUTION INTRAMUSCULAR; INTRAVENOUS
Status: COMPLETED | OUTPATIENT
Start: 2023-02-07 | End: 2023-02-07

## 2023-02-07 RX ORDER — SODIUM CHLORIDE 9 MG/ML
1000 INJECTION, SOLUTION INTRAVENOUS
Status: COMPLETED | OUTPATIENT
Start: 2023-02-07 | End: 2023-02-07

## 2023-02-07 RX ADMIN — ONDANSETRON 4 MG: 2 INJECTION INTRAMUSCULAR; INTRAVENOUS at 06:02

## 2023-02-07 RX ADMIN — FENTANYL CITRATE 100 MCG: 50 INJECTION, SOLUTION INTRAMUSCULAR; INTRAVENOUS at 06:02

## 2023-02-07 RX ADMIN — ONDANSETRON 4 MG: 2 INJECTION INTRAMUSCULAR; INTRAVENOUS at 09:02

## 2023-02-07 RX ADMIN — FENTANYL CITRATE 100 MCG: 50 INJECTION, SOLUTION INTRAMUSCULAR; INTRAVENOUS at 09:02

## 2023-02-07 RX ADMIN — SODIUM CHLORIDE 1000 ML: 9 INJECTION, SOLUTION INTRAVENOUS at 06:02

## 2023-02-07 RX ADMIN — SODIUM CHLORIDE, POTASSIUM CHLORIDE, SODIUM LACTATE AND CALCIUM CHLORIDE 1000 ML: 600; 310; 30; 20 INJECTION, SOLUTION INTRAVENOUS at 07:02

## 2023-02-07 RX ADMIN — PIPERACILLIN AND TAZOBACTAM 4.5 G: 4; .5 INJECTION, POWDER, LYOPHILIZED, FOR SOLUTION INTRAVENOUS; PARENTERAL at 08:02

## 2023-02-07 RX ADMIN — IOPAMIDOL 100 ML: 755 INJECTION, SOLUTION INTRAVENOUS at 07:02

## 2023-02-08 PROBLEM — K56.609 SMALL BOWEL OBSTRUCTION: Status: ACTIVE | Noted: 2023-02-08

## 2023-02-08 LAB
ALBUMIN SERPL-MCNC: 3.4 G/DL (ref 3.5–5)
ALBUMIN/GLOB SERPL: 1.1 RATIO (ref 1.1–2)
ALP SERPL-CCNC: 57 UNIT/L (ref 40–150)
ALT SERPL-CCNC: 8 UNIT/L (ref 0–55)
AST SERPL-CCNC: 12 UNIT/L (ref 5–34)
BILIRUBIN DIRECT+TOT PNL SERPL-MCNC: 0.3 MG/DL
BUN SERPL-MCNC: 15 MG/DL (ref 9.8–20.1)
CALCIUM SERPL-MCNC: 9 MG/DL (ref 8.4–10.2)
CHLORIDE SERPL-SCNC: 109 MMOL/L (ref 98–107)
CO2 SERPL-SCNC: 24 MMOL/L (ref 22–29)
CREAT SERPL-MCNC: 0.72 MG/DL (ref 0.55–1.02)
ERYTHROCYTE [DISTWIDTH] IN BLOOD BY AUTOMATED COUNT: 13.4 % (ref 11.5–17)
GFR SERPLBLD CREATININE-BSD FMLA CKD-EPI: >60 MLS/MIN/1.73/M2
GLOBULIN SER-MCNC: 3 GM/DL (ref 2.4–3.5)
GLUCOSE SERPL-MCNC: 113 MG/DL (ref 74–100)
HCT VFR BLD AUTO: 34.3 % (ref 37–47)
HGB BLD-MCNC: 10.5 GM/DL (ref 12–16)
MAGNESIUM SERPL-MCNC: 1.9 MG/DL (ref 1.6–2.6)
MCH RBC QN AUTO: 27.5 PG
MCHC RBC AUTO-ENTMCNC: 30.6 MG/DL (ref 33–36)
MCV RBC AUTO: 89.8 FL (ref 80–94)
PLATELET # BLD AUTO: 214 X10(3)/MCL (ref 130–400)
PMV BLD AUTO: 10 FL (ref 7.4–10.4)
POTASSIUM SERPL-SCNC: 4.2 MMOL/L (ref 3.5–5.1)
PROT SERPL-MCNC: 6.4 GM/DL (ref 6.4–8.3)
RBC # BLD AUTO: 3.82 X10(6)/MCL (ref 4.2–5.4)
SODIUM SERPL-SCNC: 140 MMOL/L (ref 136–145)
WBC # SPEC AUTO: 5.1 X10(3)/MCL (ref 4.5–11.5)

## 2023-02-08 PROCEDURE — G0378 HOSPITAL OBSERVATION PER HR: HCPCS

## 2023-02-08 PROCEDURE — 85027 COMPLETE CBC AUTOMATED: CPT | Performed by: INTERNAL MEDICINE

## 2023-02-08 PROCEDURE — 80053 COMPREHEN METABOLIC PANEL: CPT | Performed by: INTERNAL MEDICINE

## 2023-02-08 PROCEDURE — 94761 N-INVAS EAR/PLS OXIMETRY MLT: CPT

## 2023-02-08 PROCEDURE — 96372 THER/PROPH/DIAG INJ SC/IM: CPT | Performed by: INTERNAL MEDICINE

## 2023-02-08 PROCEDURE — 83735 ASSAY OF MAGNESIUM: CPT | Performed by: INTERNAL MEDICINE

## 2023-02-08 PROCEDURE — 96376 TX/PRO/DX INJ SAME DRUG ADON: CPT

## 2023-02-08 PROCEDURE — 63600175 PHARM REV CODE 636 W HCPCS: Performed by: INTERNAL MEDICINE

## 2023-02-08 PROCEDURE — 25000003 PHARM REV CODE 250: Performed by: INTERNAL MEDICINE

## 2023-02-08 PROCEDURE — 96361 HYDRATE IV INFUSION ADD-ON: CPT

## 2023-02-08 RX ORDER — MAG HYDROX/ALUMINUM HYD/SIMETH 200-200-20
30 SUSPENSION, ORAL (FINAL DOSE FORM) ORAL 4 TIMES DAILY PRN
Status: DISCONTINUED | OUTPATIENT
Start: 2023-02-08 | End: 2023-02-11 | Stop reason: HOSPADM

## 2023-02-08 RX ORDER — HYDRALAZINE HYDROCHLORIDE 20 MG/ML
10 INJECTION INTRAMUSCULAR; INTRAVENOUS EVERY 6 HOURS PRN
Status: DISCONTINUED | OUTPATIENT
Start: 2023-02-08 | End: 2023-02-11 | Stop reason: HOSPADM

## 2023-02-08 RX ORDER — SODIUM CHLORIDE 9 MG/ML
INJECTION, SOLUTION INTRAVENOUS CONTINUOUS
Status: DISCONTINUED | OUTPATIENT
Start: 2023-02-08 | End: 2023-02-11 | Stop reason: HOSPADM

## 2023-02-08 RX ORDER — ENOXAPARIN SODIUM 100 MG/ML
40 INJECTION SUBCUTANEOUS EVERY 24 HOURS
Status: DISCONTINUED | OUTPATIENT
Start: 2023-02-08 | End: 2023-02-11 | Stop reason: HOSPADM

## 2023-02-08 RX ORDER — IBUPROFEN 200 MG
24 TABLET ORAL
Status: DISCONTINUED | OUTPATIENT
Start: 2023-02-08 | End: 2023-02-11 | Stop reason: HOSPADM

## 2023-02-08 RX ORDER — IBUPROFEN 200 MG
16 TABLET ORAL
Status: DISCONTINUED | OUTPATIENT
Start: 2023-02-08 | End: 2023-02-11 | Stop reason: HOSPADM

## 2023-02-08 RX ORDER — TALC
6 POWDER (GRAM) TOPICAL NIGHTLY PRN
Status: DISCONTINUED | OUTPATIENT
Start: 2023-02-08 | End: 2023-02-11 | Stop reason: HOSPADM

## 2023-02-08 RX ORDER — ONDANSETRON 2 MG/ML
4 INJECTION INTRAMUSCULAR; INTRAVENOUS EVERY 8 HOURS PRN
Status: DISCONTINUED | OUTPATIENT
Start: 2023-02-08 | End: 2023-02-11 | Stop reason: HOSPADM

## 2023-02-08 RX ORDER — GLUCAGON 1 MG
1 KIT INJECTION
Status: DISCONTINUED | OUTPATIENT
Start: 2023-02-08 | End: 2023-02-11 | Stop reason: HOSPADM

## 2023-02-08 RX ORDER — PROMETHAZINE HYDROCHLORIDE 25 MG/1
25 TABLET ORAL EVERY 6 HOURS PRN
Status: DISCONTINUED | OUTPATIENT
Start: 2023-02-08 | End: 2023-02-11 | Stop reason: HOSPADM

## 2023-02-08 RX ORDER — MORPHINE SULFATE 4 MG/ML
2 INJECTION, SOLUTION INTRAMUSCULAR; INTRAVENOUS EVERY 4 HOURS PRN
Status: DISCONTINUED | OUTPATIENT
Start: 2023-02-08 | End: 2023-02-08

## 2023-02-08 RX ORDER — BISACODYL 10 MG
10 SUPPOSITORY, RECTAL RECTAL DAILY PRN
Status: DISCONTINUED | OUTPATIENT
Start: 2023-02-08 | End: 2023-02-11 | Stop reason: HOSPADM

## 2023-02-08 RX ORDER — ACETAMINOPHEN 325 MG/1
650 TABLET ORAL EVERY 8 HOURS PRN
Status: DISCONTINUED | OUTPATIENT
Start: 2023-02-08 | End: 2023-02-11 | Stop reason: HOSPADM

## 2023-02-08 RX ORDER — KETOROLAC TROMETHAMINE 30 MG/ML
15 INJECTION, SOLUTION INTRAMUSCULAR; INTRAVENOUS EVERY 6 HOURS PRN
Status: DISPENSED | OUTPATIENT
Start: 2023-02-08 | End: 2023-02-11

## 2023-02-08 RX ORDER — HYDROCODONE BITARTRATE AND ACETAMINOPHEN 5; 325 MG/1; MG/1
1 TABLET ORAL EVERY 6 HOURS PRN
Status: DISCONTINUED | OUTPATIENT
Start: 2023-02-08 | End: 2023-02-08

## 2023-02-08 RX ORDER — SODIUM CHLORIDE 0.9 % (FLUSH) 0.9 %
10 SYRINGE (ML) INJECTION
Status: DISCONTINUED | OUTPATIENT
Start: 2023-02-08 | End: 2023-02-11 | Stop reason: HOSPADM

## 2023-02-08 RX ADMIN — PROMETHAZINE HYDROCHLORIDE 25 MG: 25 TABLET ORAL at 10:02

## 2023-02-08 RX ADMIN — KETOROLAC TROMETHAMINE 15 MG: 30 INJECTION, SOLUTION INTRAMUSCULAR; INTRAVENOUS at 11:02

## 2023-02-08 RX ADMIN — PROMETHAZINE HYDROCHLORIDE 25 MG: 25 TABLET ORAL at 12:02

## 2023-02-08 RX ADMIN — ENOXAPARIN SODIUM 40 MG: 40 INJECTION SUBCUTANEOUS at 04:02

## 2023-02-08 RX ADMIN — SODIUM CHLORIDE: 9 INJECTION, SOLUTION INTRAVENOUS at 02:02

## 2023-02-08 RX ADMIN — ONDANSETRON 4 MG: 2 INJECTION INTRAMUSCULAR; INTRAVENOUS at 11:02

## 2023-02-08 RX ADMIN — KETOROLAC TROMETHAMINE 15 MG: 30 INJECTION, SOLUTION INTRAMUSCULAR; INTRAVENOUS at 06:02

## 2023-02-08 NOTE — CONSULTS
[facility]  Medical Surgical Unit  General Surgery  Consult Note    Consults  Subjective:     Chief Complaint/Reason for Admission:  Sudden onset of abdominal pain    History of Present Illness:  58-year-old  female with multiple surgeries of the pelvis including colon resection for colon cancer and ventral wall hernia repair x2.  She admits that approximately 5 years ago she developed a partial small-bowel obstruction which was relieved by conservative management and NG tube decompression.  At the time of admission CT scan was performed without oral contrast which did identify changes of the small bowel suggestive of partial small-bowel obstruction.  Since NG tube decompression she states she feels somewhat better.  She has not passed flatus or stool.  NG tube output has been minimal less than 400 in the last 24 hours and appears to be gastric content only.  She denies any recent sick contacts or questionable food intake.    No current facility-administered medications on file prior to encounter.     Current Outpatient Medications on File Prior to Encounter   Medication Sig    amitriptyline (ELAVIL) 10 MG tablet Take 10 mg by mouth every evening.    lisinopriL 10 MG tablet Take 1 tablet (10 mg total) by mouth once daily.    nystatin (MYCOSTATIN) 100,000 unit/mL suspension Take 10 mLs by mouth 4 (four) times daily.       Review of patient's allergies indicates:   Allergen Reactions    Codeine Itching    Latex, natural rubber        Past Medical History:   Diagnosis Date    Depression     History of colon cancer     Hypertension      Past Surgical History:   Procedure Laterality Date    APPENDECTOMY       SECTION      COLON RESECTION      HERNIA REPAIR       Family History       Problem Relation (Age of Onset)    Colon cancer Father          Tobacco Use    Smoking status: Never    Smokeless tobacco: Never   Substance and Sexual Activity    Alcohol use: Not Currently    Drug use:  Never    Sexual activity: Not on file     Review of Systems  Objective:     Vital Signs (Most Recent):  Temp: 98.5 °F (36.9 °C) (02/08/23 1602)  Pulse: 60 (02/08/23 1602)  Resp: 18 (02/07/23 2303)  BP: 116/74 (02/08/23 1602)  SpO2: 100 % (02/08/23 1602) Vital Signs (24h Range):  Temp:  [97.4 °F (36.3 °C)-98.6 °F (37 °C)] 98.5 °F (36.9 °C)  Pulse:  [59-75] 60  Resp:  [16-18] 18  SpO2:  [92 %-100 %] 100 %  BP: (110-159)/(67-90) 116/74     Weight: 70.8 kg (156 lb)  Body mass index is 25.96 kg/m².    No intake or output data in the 24 hours ending 02/08/23 1640    Physical Exam  Abdominal:      General: Abdomen is flat. Bowel sounds are normal. There is no distension.      Palpations: Abdomen is soft. There is no mass.      Tenderness: There is abdominal tenderness. There is no right CVA tenderness, left CVA tenderness, guarding or rebound.      Hernia: No hernia is present.      Comments: Patient has some mild tenderness to deep palpation but no rebound exam noted.  Abdomen is flat without distention       Significant Labs:  CBC:   Recent Labs   Lab 02/08/23  0502   WBC 5.1   RBC 3.82*   HGB 10.5*   HCT 34.3*      MCV 89.8   MCH 27.5   MCHC 30.6*     CMP:   Recent Labs   Lab 02/08/23  0502   CALCIUM 9.0   ALBUMIN 3.4*      K 4.2   CO2 24   BUN 15.0   CREATININE 0.72   ALKPHOS 57   ALT 8   AST 12   BILITOT 0.3       Significant Diagnostics:  CT: I have reviewed all pertinent results/findings within the past 24 hours. .    Assessment/Plan:     Active Diagnoses:    Diagnosis Date Noted POA    PRINCIPAL PROBLEM:  Small bowel obstruction [K56.609] 02/08/2023 Unknown    Hypertension [I10] 07/28/2022 Yes    Depression [F32.A] 07/28/2022 Yes      Problems Resolved During this Admission:   Continue with IV hydration and NG tube decompression  Monitor for return of bowel function and when the passage of flatus or passage of stool we can begin to advance to a liquid diet  Encourage ambulation  Monitoring NG tube  output and clinical exam    Thank you for your consult. I will follow-up with patient. Please contact us if you have any additional questions.    Summer Munroe MD  General Surgery  [facility] - Medical Surgical Unit

## 2023-02-08 NOTE — HPI
Ms. Valencia is a 58 year old  female with a history of colon cancer s/p colon resection in 2003, HTN, and depression who presented to the ER today with acute onset of abdominal pain. She was in her normal state of health until yesterday afternoon at 3 pm when she developed nonradiating epigastric abdominal pain 10/10 in severity, chills, and nausea. She denies any vomiting, fevers, chest pain, or shortness of breath and her last bowel movement was on Monday, 2/6/2023. Her abdominal pain persisted which is what prompted her to come to [facility] for further evaluation. On arrival to the ER she was hypertensive with a blood pressure of 159/90 and her initial labwork was unremarkable. CT of the abdomen and pelvis showed findings suspicious for a partial or early complete small bowel obstruction with transition point in either the jejunum or proximal ileum and she had an NG tube placed in the ER. She has received zosyn 4.5 gm IV, fentanyl 100 mcg IV, zofran 4 mg IV, and 1 liter of IV fluids in the ER but she still complains of abdominal pain 9/10 in severity. She is otherwise in stable condition and she has no other complaints today.

## 2023-02-08 NOTE — ED PROVIDER NOTES
Encounter Date: 2023       History     Chief Complaint   Patient presents with    Abdominal Pain     C/o abd pain since 3pm this afternoon. Denies N/V/D     Mrs. Krishnamurthy is a 58-year-old female PMHx HTN, Colon CA s/p Resection, GERD, who presents with chief complaint abdominal pain.  Onset was today around 1500 whenever she began having constant severe upper abdominal pain that she describes as twisting sensation, nothing seems to make it better or worse.  States feels like when she had bowel obstruction in the past.  Denies nausea or vomiting or obstipation.    The history is provided by the patient.   Review of patient's allergies indicates:   Allergen Reactions    Codeine Itching    Latex, natural rubber      Past Medical History:   Diagnosis Date    Depression     History of colon cancer     Hypertension      Past Surgical History:   Procedure Laterality Date    APPENDECTOMY       SECTION      COLON RESECTION      HERNIA REPAIR       Family History   Problem Relation Age of Onset    Colon cancer Father      Social History     Tobacco Use    Smoking status: Never    Smokeless tobacco: Never   Substance Use Topics    Alcohol use: Not Currently    Drug use: Never     Review of Systems    Physical Exam     Initial Vitals [23 1802]   BP Pulse Resp Temp SpO2   (!) 146/89 72 16 97.5 °F (36.4 °C) 100 %      MAP       --         Physical Exam    Nursing note and vitals reviewed.  Constitutional: Vital signs are normal. She appears well-developed and well-nourished.   HENT:   Head: Normocephalic and atraumatic.   Mouth/Throat: Uvula is midline and mucous membranes are normal.   Eyes: EOM are normal. Pupils are equal, round, and reactive to light.   Neck: Trachea normal. Neck supple.   Cardiovascular:  Normal rate, regular rhythm, intact distal pulses and normal pulses.           Pulmonary/Chest: Effort normal and breath sounds normal.   Abdominal: Abdomen is soft. Bowel sounds are normal. There is  abdominal tenderness in the right upper quadrant, epigastric area and left upper quadrant.   Musculoskeletal:         General: Normal range of motion.      Cervical back: Neck supple.     Neurological: She is alert and oriented to person, place, and time. GCS score is 15. GCS eye subscore is 4. GCS verbal subscore is 5. GCS motor subscore is 6.   Skin: Skin is warm and dry.   Psychiatric: She has a normal mood and affect. Her speech is normal. Thought content normal.       ED Course   Procedures  Labs Reviewed   CBC WITH DIFFERENTIAL - Abnormal; Notable for the following components:       Result Value    RBC 4.07 (*)     Hgb 11.4 (*)     Hct 36.2 (*)     MCHC 31.5 (*)     All other components within normal limits   URINALYSIS, REFLEX TO URINE CULTURE - Abnormal; Notable for the following components:    Ketones, UA Trace (*)     All other components within normal limits   LIPASE - Normal   LACTIC ACID, PLASMA - Normal   COMPREHENSIVE METABOLIC PANEL          Imaging Results              X-Ray Chest 1 View (Final result)  Result time 02/07/23 21:01:54      Final result by Brijesh Paez MD (02/07/23 21:01:54)                   Impression:      Nasogastric tube in the gastric antrum.      Electronically signed by: Brijesh Paez MD  Date:    02/07/2023  Time:    21:01               Narrative:    EXAMINATION:  Single view chest radiograph.    CLINICAL HISTORY:  Ng tube verfication;    TECHNIQUE:  Single view of the chest.    COMPARISON:  Chest radiograph 07/02/2020    FINDINGS:  The lungs are clear without consolidation or effusion.  There is no pneumothorax.  There is a nasogastric tube in the gastric antrum.  The cardiac silhouette is normal in size.  There is no acute osseous abnormality.                                       CT Abdomen Pelvis With Contrast (Final result)  Result time 02/07/23 20:11:41      Final result by Daniel Chopra MD (02/07/23 20:11:41)                   Impression:        1. Findings suspicious  for a partial or early complete small bowel obstruction with transition point in either the jejunum or proximal ileum.  2.  The findings can be consistent with the clinical diagnosis of constipation.  3. Leiomyomatous uterus.  4. Postsurgical changes from prior ventral pelvic and lower abdominal wall herniorrhaphy.      Electronically signed by: Daniel Chopra MD  Date:    02/07/2023  Time:    20:11               Narrative:      CT SCAN OF ABDOMEN AND PELVIS WITH CONTRAST:    CPT 28185    Total DLP: 374 mGy-cm. Automatic exposure control was utilized to limit the radiation dose to the patient.  Total contrast: 100.0 mL in unspecified peripheral vein.      History: Acute onset of diffuse abdominal pain.    Comparison: 08/20/2018.    Technique: Multiple contiguous axial images were acquired from the base of the chest to the femoral trochanters with intravenous contrast administration. Oral contrast was not administered.    Findings:  The partially visualized bases of the lungs are unremarkable except for subsegmental/discoid atelectasis.  The base of the heart is at the upper limits of normal in size.  There is unchanged appearance of a scar on the lower pole the right kidney.  No focal liver lesions are identified.  There is distention of several loops of small bowel with air-fluid levels and hyperemic enhancing walls mainly left lower quadrant presumed jejunum versus proximal ileum but with hyperemia less prominent extending into the ileum.  There is stool throughout the colon to the rectum.  The appendix is not visualized, but no pericecal area inflammatory changes or stranding are identified.  The uterus is anteverted the left and heterogeneous with partially calcified fibroids.  No hyperdense nephroureterolithiasis or hydroureteronephrosis is present with phleboliths in the pelvis.  There has been prior ventral pelvic and lower abdominal wall herniorrhaphy.  The other organs in the abdomen and pelvis are grossly  unremarkable. There is no free fluid, focal fluid collections, free air, or significant mesenteric inflammatory stranding.                          ED Interpretation by Eulalio Hoover DO (02/07/23 19:44:04, [facility] - Emergency Dept, Emergency Medicine)    Small bowel obstruction.                                     Medications   sodium chloride 0.9% flush 10 mL (has no administration in time range)   melatonin tablet 6 mg (has no administration in time range)   glucose chewable tablet 16 g (has no administration in time range)   glucose chewable tablet 24 g (has no administration in time range)   dextrose 50% injection 12.5 g (has no administration in time range)   dextrose 50% injection 25 g (has no administration in time range)   glucagon (human recombinant) injection 1 mg (has no administration in time range)   dextrose 10% bolus 125 mL 125 mL (has no administration in time range)   dextrose 10% bolus 250 mL 250 mL (has no administration in time range)   0.9%  NaCl infusion ( Intravenous New Bag 2/8/23 0230)   enoxaparin injection 40 mg (has no administration in time range)   bisacodyL suppository 10 mg (has no administration in time range)   ondansetron injection 4 mg (has no administration in time range)   promethazine tablet 25 mg (has no administration in time range)   acetaminophen tablet 650 mg (has no administration in time range)   aluminum-magnesium hydroxide-simethicone 200-200-20 mg/5 mL suspension 30 mL (has no administration in time range)   hydrALAZINE injection 10 mg (has no administration in time range)   ketorolac injection 15 mg (15 mg Intravenous Given 2/8/23 0625)   fentaNYL injection 100 mcg (100 mcg Intravenous Given 2/7/23 1856)   ondansetron injection 4 mg (4 mg Intravenous Given 2/7/23 1856)   0.9%  NaCl infusion (1,000 mLs Intravenous New Bag 2/7/23 1856)   iopamidoL (ISOVUE-370) injection 100 mL (100 mLs Intravenous Given 2/7/23 1934)   piperacillin-tazobactam (ZOSYN) 4.5  g in dextrose 5 % in water (D5W) 5 % 100 mL IVPB (MB+) (0 g Intravenous Stopped 2/7/23 2110)   lactated ringers infusion (1,000 mLs Intravenous New Bag 2/7/23 1945)   fentaNYL injection 100 mcg (100 mcg Intravenous Given 2/7/23 2124)   ondansetron injection 4 mg (4 mg Intravenous Given 2/7/23 2123)     Medical Decision Making:   Initial Assessment:   Well-appearing 58-year-old female who presents with sudden-onset severe upper abdominal pain that she states feels similar to when she is had bowel obstructions in the past.  We will check labs and get CT to evaluate for any evidence of obstruction or other acute pathology.    CT abdomen and pelvis shows small bowel obstruction just as the patient had suggested.  NG tube was placed to low intermittent suction and she was given IV Zosyn.  General surgery was consulted, Dr. Munroe, who recommends hospitalist admission and will see in consult.  I have spoken with the patient and/or caregivers. I have explained the patient's condition, diagnoses and treatment plan based on the information available to me at this time. I have answered the patient's and/or caregiver's questions and addressed any concerns. The patient and/or caregivers have as good an understanding of the patient's diagnosis, condition and treatment plan as can be expected at this point. The patient has been stabilized within the capability of the emergency department. The patient will be transported for further care and management or will be moved to an observation or inpatient service. I have communicated with the staff or medical practitioner taking over this patient's care.    I have personally provided 10 minutes of critical care time exclusive of time spent on separately billable procedures. Time includes review of laboratory data, radiology results, discussion with consultants, and monitoring for potential decompensation. Interventions were performed as documented above.   Clinical Tests:   Lab Tests:  Ordered and Reviewed  Radiological Study: Ordered and Reviewed           ED Course as of 02/08/23 0806   Tue Feb 07, 2023 1951 Patient reports pain improved but now starting to get nauseated. Will place NG tube due to CT showing signs of SBO. [IB]   2024 Case discussed with General Surgery, Dr. Munroe, who recommends admitting to hospitalist and will see as consult. [IB]      ED Course User Index  [IB] Eulalio Hoover DO                 Clinical Impression:   Final diagnoses:  [K56.609] SBO (small bowel obstruction) (Primary)        ED Disposition Condition    Observation Stable                Eulalio Hoover DO  02/08/23 0807

## 2023-02-08 NOTE — FIRST PROVIDER EVALUATION
"Medical screening examination initiated.  I have conducted a focused provider triage encounter, findings are as follows:    Brief history of present illness:  59 y/o female presents with sudden onset of mid periumbilical type abdominal pain which started at 3pm. No n/v. No dysuria. Hx colon CA, appendicitis    Vitals:    02/07/23 1802   BP: (!) 146/89   BP Location: Right arm   Patient Position: Sitting   Pulse: 72   Resp: 16   Temp: 97.5 °F (36.4 °C)   TempSrc: Oral   SpO2: 100%   Weight: 70.8 kg (156 lb)   Height: 5' 5" (1.651 m)       Pertinent physical exam:  alert, nonlabored, ambulatory    Brief workup plan:  labs, urine    Preliminary workup initiated; this workup will be continued and followed by the physician or advanced practice provider that is assigned to the patient when roomed.  "

## 2023-02-08 NOTE — H&P
History and Physical     Chief Complaint:     Abdominal pain    HPI:     Patient is a 47 y.o. male with a past medical history significant for colon cancer in remission only after colon resection colostomy and reversal.  Patient has been doing quite well recently she denies any GI problems until yesterday.  She had a relatively abrupt onset of abdominal cramping started last evening.  She denied noted fever but did have chills.  She had an episode similar to this couple years back.  Her symptoms progressed overnight she presented to [facility].  Upon evaluation patient noted to have CT consistent with small-bowel obstruction.  NG tube is been placed surgery has been consulted.  Upon my evaluation pain seems to be relatively well controlled she does feel better with the NG tube.      Primary Doctor No is the PCP    Past Medical History:   Diagnosis Date    Depression     History of colon cancer     Hypertension      Past Surgical History:   Procedure Laterality Date    APPENDECTOMY       SECTION      COLON RESECTION      HERNIA REPAIR        No significant past family medical history he has she she    Social History     Tobacco Use    Smoking status: Never Smoker    Smokeless tobacco: Never Used   Substance Use Topics    Alcohol use: Not on file     (Not in a hospital admission)    Review of patient's allergies indicates:  Not on File         Review of Systems:     Review of Systems   Constitutional:  Positive for chills and malaise/fatigue (chief). Negative for fever.   HENT: Negative.  Negative for hearing loss.    Eyes:  Negative for blurred vision.   Respiratory:  Negative for cough and shortness of breath.    Cardiovascular:  Negative for chest pain and palpitations.   Gastrointestinal:  Negative for diarrhea, heartburn and nausea.   Genitourinary:  Negative for dysuria and urgency.   Musculoskeletal:  Negative for myalgias and neck pain.   Skin: Negative.  Negative for itching and rash.    Neurological:  Negative for dizziness and headaches.   Endo/Heme/Allergies: Negative.  Negative for environmental allergies. Does not bruise/bleed easily.   Psychiatric/Behavioral: Negative.  Negative for depression and suicidal ideas.    All other systems reviewed and are negative.    Objective:       VITAL SIGNS: 24 HR MIN & MAX LAST    Temp  Min: 97.4 °F (36.3 °C)  Max: 98.6 °F (37 °C)           BP  Min: 110/67  Max: 159/90        Pulse  Min: 59  Max: 75        Resp  Min: 16  Max: 18       SpO2  Min: 92 %  Max: 100 %         Physical Exam  Vitals reviewed.   Constitutional:       General: She is not in acute distress.     Appearance: Normal appearance. She is not ill-appearing or toxic-appearing.   HENT:      Head: Normocephalic.      Comments: NG tube in place     Nose: Nose normal.      Mouth/Throat:      Mouth: Mucous membranes are moist.   Eyes:      Extraocular Movements: Extraocular movements intact.      Conjunctiva/sclera: Conjunctivae normal.      Pupils: Pupils are equal, round, and reactive to light.   Cardiovascular:      Rate and Rhythm: Normal rate and regular rhythm.      Pulses: Normal pulses.      Heart sounds: Normal heart sounds. No murmur heard.    No gallop.   Pulmonary:      Effort: Pulmonary effort is normal. No respiratory distress.      Breath sounds: Normal breath sounds. No wheezing, rhonchi or rales.   Abdominal:      General: Bowel sounds are normal. There is no distension.      Palpations: Abdomen is soft.      Tenderness: There is abdominal tenderness. There is no guarding or rebound.      Comments: Hypoactive bowel sounds   Musculoskeletal:         General: No swelling, tenderness or deformity. Normal range of motion.      Cervical back: Normal range of motion and neck supple. No rigidity or tenderness.      Right lower leg: No edema.      Left lower leg: No edema.   Skin:     General: Skin is warm and dry.   Neurological:      General: No focal deficit present.      Mental  Status: She is alert and oriented to person, place, and time.   Psychiatric:         Mood and Affect: Mood normal.         Behavior: Behavior normal.         Thought Content: Thought content normal.         Judgment: Judgment normal.         No results found for this or any previous visit (from the past 48 hour(s)).    No orders to display       Assessment / Plan:     Active Hospital Problems    Diagnosis    *Small bowel obstruction    Hypertension    Depression     #small-bowel obstruction NG tube is been placed  Surgical consultation pain seems to be better.    -history of colon cancer-noted to be in remission status post hemicolectomy with colostomy placement and subsequent reversal  -patient has history of having partial/bowel obstruction  -improved symptoms with NG tube placement   -defer to surgical management  -gentle IV fluids and pain medication        Lovenox sufficient for DVT prophylaxis      This patient expected to need greater than 2 midnights of care based on abdominal pain bowel obstruction.  This level of care cannot be provided on an outpatient basis

## 2023-02-08 NOTE — ASSESSMENT & PLAN NOTE
Continue NG tube for decompression, NPO, pain control, IV fluids, and as needed antiemetics. Consult general surgery.

## 2023-02-08 NOTE — H&P
[facility] - Medical Surgical Unit  [facility] Medicine  Telehospitalist History & Physical    Patient Name: Yessy Valencia  MRN: [id number]  Patient Class: OP- Observation  Admission Date: 2023  Attending Physician: Joo Demarco MD  Primary Care Provider: Keisha Torres MD         Patient information was obtained from patient and ER records.     Subjective:     Principal Problem:Small bowel obstruction    Chief Complaint: Abdominal pain.    HPI: Ms. Valencia is a 58 year old  female with a history of colon cancer s/p colon resection in , HTN, and depression who presented to the ER today with acute onset of abdominal pain. She was in her normal state of health until yesterday afternoon at 3 pm when she developed nonradiating epigastric abdominal pain 10/10 in severity, chills, and nausea. She denies any vomiting, fevers, chest pain, or shortness of breath and her last bowel movement was on Monday, 2023. Her abdominal pain persisted which is what prompted her to come to [facility] for further evaluation. On arrival to the ER she was hypertensive with a blood pressure of 159/90 and her initial labwork was unremarkable. CT of the abdomen and pelvis showed findings suspicious for a partial or early complete small bowel obstruction with transition point in either the jejunum or proximal ileum and she had an NG tube placed in the ER. She has received zosyn 4.5 gm IV, fentanyl 100 mcg IV, zofran 4 mg IV, and 1 liter of IV fluids in the ER but she still complains of abdominal pain 9/10 in severity. She is otherwise in stable condition and she has no other complaints today.      Past Medical History:   Diagnosis Date    Depression     History of colon cancer     Hypertension        Past Surgical History:   Procedure Laterality Date    APPENDECTOMY       SECTION      COLON RESECTION      HERNIA REPAIR         Review of patient's allergies indicates:   Allergen  Reactions    Codeine Itching    Latex, natural rubber        No current facility-administered medications on file prior to encounter.     Current Outpatient Medications on File Prior to Encounter   Medication Sig    amitriptyline (ELAVIL) 10 MG tablet Take 10 mg by mouth every evening.    lisinopriL 10 MG tablet Take 1 tablet (10 mg total) by mouth once daily.    nystatin (MYCOSTATIN) 100,000 unit/mL suspension Take 10 mLs by mouth 4 (four) times daily.     Family History       Problem Relation (Age of Onset)    Colon cancer Father        Social History:   Tobacco Use    Smoking status: Never    Smokeless tobacco: Never   Substance and Sexual Activity    Alcohol use: Not Currently    Drug use: Never    Sexual activity: Not on file     Review of Systems   Constitutional:  Positive for chills.   HENT: Negative.     Eyes: Negative.    Respiratory: Negative.     Cardiovascular: Negative.    Gastrointestinal:  Positive for abdominal pain and nausea.   Endocrine: Negative.    Genitourinary: Negative.    Musculoskeletal: Negative.    Skin: Negative.    Allergic/Immunologic: Negative.    Neurological: Negative.    Hematological: Negative.    Psychiatric/Behavioral: Negative.     Objective:     Vital Signs (Most Recent):  Temp: 97.4 °F (36.3 °C) (02/07/23 2303)  Pulse: 60 (02/07/23 2303)  Resp: 18 (02/07/23 2303)  BP: (!) 146/84 (02/07/23 2303)  SpO2: 100 % (02/07/23 2303)   Vital Signs (24h Range):  Temp:  [97.4 °F (36.3 °C)-97.5 °F (36.4 °C)] 97.4 °F (36.3 °C)  Pulse:  [60-75] 60  Resp:  [16-18] 18  SpO2:  [98 %-100 %] 100 %  BP: (118-159)/(79-90) 146/84     Weight: 70.8 kg (156 lb)  Body mass index is 25.96 kg/m².    Physical Exam     General -  female in no acute distress.  HEENT - NG tube in place. NCAT. No scleral icterus. Oropharynx clear. Mucous membranes moist.  Neck - No lymphadenopathy, thyromegaly, or JVD.  CVS - Regular rate and rhythm, No murmurs, rubs, or gallops.  Resp - Lungs are clear to  auscultation bilaterally. No rales, wheeze, or rhonchi.   GI - Soft, tenderness to palpation in the epigastric region, nondistended, normoactive bowel sounds present.   Extremities - No clubbing, cyanosis, or edema.   Neuro - CN II through XII are grossly intact. No focal neurological deficits. Alert and oriented times four.   Psych - Normal affect.  Skin - No rash, skin tear, or ulcer.      Significant Labs: All pertinent labs within the past 24 hours have been reviewed.  CBC:   Recent Labs   Lab 02/07/23  1832   WBC 6.1   HGB 11.4*   HCT 36.2*        CMP:   Recent Labs   Lab 02/07/23  1832      K 4.2   CO2 29   BUN 20.0   CREATININE 0.85   CALCIUM 9.9   ALBUMIN 4.0   BILITOT 0.5   ALKPHOS 65   AST 13   ALT 10     Lipase:   Recent Labs   Lab 02/07/23  1832   LIPASE 11     Urine Studies:   Recent Labs   Lab 02/07/23  1835   APPEARANCEUA Clear   PROTEINUA Negative   BILIRUBINUA Negative   UROBILINOGEN 0.2   LEUKOCYTESUR Negative       Significant Imaging: I have reviewed all pertinent imaging results/findings within the past 24 hours.  Imaging Results              X-Ray Chest 1 View (Final result)  Result time 02/07/23 21:01:54      Final result by Brijesh Paez MD (02/07/23 21:01:54)                   Impression:      Nasogastric tube in the gastric antrum.      Electronically signed by: Brijesh Paez MD  Date:    02/07/2023  Time:    21:01               Narrative:    EXAMINATION:  Single view chest radiograph.    CLINICAL HISTORY:  Ng tube verfication;    TECHNIQUE:  Single view of the chest.    COMPARISON:  Chest radiograph 07/02/2020    FINDINGS:  The lungs are clear without consolidation or effusion.  There is no pneumothorax.  There is a nasogastric tube in the gastric antrum.  The cardiac silhouette is normal in size.  There is no acute osseous abnormality.                                       CT Abdomen Pelvis With Contrast (Final result)  Result time 02/07/23 20:11:41      Final result by Daniel CARRILLO  MD Keysha (02/07/23 20:11:41)                   Impression:        1. Findings suspicious for a partial or early complete small bowel obstruction with transition point in either the jejunum or proximal ileum.  2.  The findings can be consistent with the clinical diagnosis of constipation.  3. Leiomyomatous uterus.  4. Postsurgical changes from prior ventral pelvic and lower abdominal wall herniorrhaphy.      Electronically signed by: Daniel Chopra MD  Date:    02/07/2023  Time:    20:11               Narrative:      CT SCAN OF ABDOMEN AND PELVIS WITH CONTRAST:    CPT 45107    Total DLP: 374 mGy-cm. Automatic exposure control was utilized to limit the radiation dose to the patient.  Total contrast: 100.0 mL in unspecified peripheral vein.      History: Acute onset of diffuse abdominal pain.    Comparison: 08/20/2018.    Technique: Multiple contiguous axial images were acquired from the base of the chest to the femoral trochanters with intravenous contrast administration. Oral contrast was not administered.    Findings:  The partially visualized bases of the lungs are unremarkable except for subsegmental/discoid atelectasis.  The base of the heart is at the upper limits of normal in size.  There is unchanged appearance of a scar on the lower pole the right kidney.  No focal liver lesions are identified.  There is distention of several loops of small bowel with air-fluid levels and hyperemic enhancing walls mainly left lower quadrant presumed jejunum versus proximal ileum but with hyperemia less prominent extending into the ileum.  There is stool throughout the colon to the rectum.  The appendix is not visualized, but no pericecal area inflammatory changes or stranding are identified.  The uterus is anteverted the left and heterogeneous with partially calcified fibroids.  No hyperdense nephroureterolithiasis or hydroureteronephrosis is present with phleboliths in the pelvis.  There has been prior ventral pelvic and  lower abdominal wall herniorrhaphy.  The other organs in the abdomen and pelvis are grossly unremarkable. There is no free fluid, focal fluid collections, free air, or significant mesenteric inflammatory stranding.                          ED Interpretation by Eulalio Hoover DO (02/07/23 19:44:04, [facility] - Emergency Dept, Emergency Medicine)    Small bowel obstruction.                                      Assessment/Plan:     * Small bowel obstruction  Continue NG tube for decompression, NPO, pain control, IV fluids, and as needed antiemetics. Consult general surgery.    Hypertension  Continue IV hydralazine as needed. Hold lisinopril while NPO.    Depression  Hold amitriptyline while NPO.       VTE Risk Mitigation (From admission, onward)           Ordered     enoxaparin injection 40 mg  Daily         02/08/23 0129     IP VTE HIGH RISK PATIENT  Once         02/08/23 0129                  This service was provided via telemedicine.  Type of Software: Audio/Visual.  Originating Site: [facility].  Distant Site: [place], [place].  Her exam was performed with the assistance of the house supervisor Colin.     The patient was admitted to hospital observation services under my care.     Joo Demarco MD  Department of Hospital Medicine   [facility] - Medical Surgical Unit

## 2023-02-08 NOTE — SUBJECTIVE & OBJECTIVE
Past Medical History:   Diagnosis Date    Depression     History of colon cancer     Hypertension        Past Surgical History:   Procedure Laterality Date    APPENDECTOMY       SECTION      COLON RESECTION      HERNIA REPAIR         Review of patient's allergies indicates:   Allergen Reactions    Codeine Itching    Latex, natural rubber        No current facility-administered medications on file prior to encounter.     Current Outpatient Medications on File Prior to Encounter   Medication Sig    amitriptyline (ELAVIL) 10 MG tablet Take 10 mg by mouth every evening.    lisinopriL 10 MG tablet Take 1 tablet (10 mg total) by mouth once daily.    nystatin (MYCOSTATIN) 100,000 unit/mL suspension Take 10 mLs by mouth 4 (four) times daily.     Family History       Problem Relation (Age of Onset)    Colon cancer Father        Social History:   Tobacco Use    Smoking status: Never    Smokeless tobacco: Never   Substance and Sexual Activity    Alcohol use: Not Currently    Drug use: Never    Sexual activity: Not on file     Review of Systems   Constitutional:  Positive for chills.   HENT: Negative.     Eyes: Negative.    Respiratory: Negative.     Cardiovascular: Negative.    Gastrointestinal:  Positive for abdominal pain and nausea.   Endocrine: Negative.    Genitourinary: Negative.    Musculoskeletal: Negative.    Skin: Negative.    Allergic/Immunologic: Negative.    Neurological: Negative.    Hematological: Negative.    Psychiatric/Behavioral: Negative.     Objective:     Vital Signs (Most Recent):  Temp: 97.4 °F (36.3 °C) (23)  Pulse: 60 (23)  Resp: 18 (23)  BP: (!) 146/84 (23)  SpO2: 100 % (23)   Vital Signs (24h Range):  Temp:  [97.4 °F (36.3 °C)-97.5 °F (36.4 °C)] 97.4 °F (36.3 °C)  Pulse:  [60-75] 60  Resp:  [16-18] 18  SpO2:  [98 %-100 %] 100 %  BP: (118-159)/(79-90) 146/84     Weight: 70.8 kg (156 lb)  Body mass index is 25.96 kg/m².    Physical Exam      General -  female in no acute distress.  HEENT - NG tube in place. NCAT. No scleral icterus. Oropharynx clear. Mucous membranes moist.  Neck - No lymphadenopathy, thyromegaly, or JVD.  CVS - Regular rate and rhythm, No murmurs, rubs, or gallops.  Resp - Lungs are clear to auscultation bilaterally. No rales, wheeze, or rhonchi.   GI - Soft, tenderness to palpation in the epigastric region, nondistended, normoactive bowel sounds present.   Extremities - No clubbing, cyanosis, or edema.   Neuro - CN II through XII are grossly intact. No focal neurological deficits. Alert and oriented times four.   Psych - Normal affect.  Skin - No rash, skin tear, or ulcer.      Significant Labs: All pertinent labs within the past 24 hours have been reviewed.  CBC:   Recent Labs   Lab 02/07/23  1832   WBC 6.1   HGB 11.4*   HCT 36.2*        CMP:   Recent Labs   Lab 02/07/23  1832      K 4.2   CO2 29   BUN 20.0   CREATININE 0.85   CALCIUM 9.9   ALBUMIN 4.0   BILITOT 0.5   ALKPHOS 65   AST 13   ALT 10     Lipase:   Recent Labs   Lab 02/07/23  1832   LIPASE 11     Urine Studies:   Recent Labs   Lab 02/07/23  1835   APPEARANCEUA Clear   PROTEINUA Negative   BILIRUBINUA Negative   UROBILINOGEN 0.2   LEUKOCYTESUR Negative       Significant Imaging: I have reviewed all pertinent imaging results/findings within the past 24 hours.  Imaging Results              X-Ray Chest 1 View (Final result)  Result time 02/07/23 21:01:54      Final result by Brijesh Paez MD (02/07/23 21:01:54)                   Impression:      Nasogastric tube in the gastric antrum.      Electronically signed by: Brijesh Paez MD  Date:    02/07/2023  Time:    21:01               Narrative:    EXAMINATION:  Single view chest radiograph.    CLINICAL HISTORY:  Ng tube verfication;    TECHNIQUE:  Single view of the chest.    COMPARISON:  Chest radiograph 07/02/2020    FINDINGS:  The lungs are clear without consolidation or effusion.  There is no  pneumothorax.  There is a nasogastric tube in the gastric antrum.  The cardiac silhouette is normal in size.  There is no acute osseous abnormality.                                       CT Abdomen Pelvis With Contrast (Final result)  Result time 02/07/23 20:11:41      Final result by Daniel Chopra MD (02/07/23 20:11:41)                   Impression:        1. Findings suspicious for a partial or early complete small bowel obstruction with transition point in either the jejunum or proximal ileum.  2.  The findings can be consistent with the clinical diagnosis of constipation.  3. Leiomyomatous uterus.  4. Postsurgical changes from prior ventral pelvic and lower abdominal wall herniorrhaphy.      Electronically signed by: Daniel Chopra MD  Date:    02/07/2023  Time:    20:11               Narrative:      CT SCAN OF ABDOMEN AND PELVIS WITH CONTRAST:    CPT 43243    Total DLP: 374 mGy-cm. Automatic exposure control was utilized to limit the radiation dose to the patient.  Total contrast: 100.0 mL in unspecified peripheral vein.      History: Acute onset of diffuse abdominal pain.    Comparison: 08/20/2018.    Technique: Multiple contiguous axial images were acquired from the base of the chest to the femoral trochanters with intravenous contrast administration. Oral contrast was not administered.    Findings:  The partially visualized bases of the lungs are unremarkable except for subsegmental/discoid atelectasis.  The base of the heart is at the upper limits of normal in size.  There is unchanged appearance of a scar on the lower pole the right kidney.  No focal liver lesions are identified.  There is distention of several loops of small bowel with air-fluid levels and hyperemic enhancing walls mainly left lower quadrant presumed jejunum versus proximal ileum but with hyperemia less prominent extending into the ileum.  There is stool throughout the colon to the rectum.  The appendix is not visualized, but no  pericecal area inflammatory changes or stranding are identified.  The uterus is anteverted the left and heterogeneous with partially calcified fibroids.  No hyperdense nephroureterolithiasis or hydroureteronephrosis is present with phleboliths in the pelvis.  There has been prior ventral pelvic and lower abdominal wall herniorrhaphy.  The other organs in the abdomen and pelvis are grossly unremarkable. There is no free fluid, focal fluid collections, free air, or significant mesenteric inflammatory stranding.                          ED Interpretation by Eulalio Hoover DO (02/07/23 19:44:04, [facility] - Emergency Dept, Emergency Medicine)    Small bowel obstruction.

## 2023-02-09 PROCEDURE — 63600175 PHARM REV CODE 636 W HCPCS: Performed by: INTERNAL MEDICINE

## 2023-02-09 PROCEDURE — 11000001 HC ACUTE MED/SURG PRIVATE ROOM

## 2023-02-09 PROCEDURE — 96361 HYDRATE IV INFUSION ADD-ON: CPT

## 2023-02-09 PROCEDURE — 25000003 PHARM REV CODE 250: Performed by: INTERNAL MEDICINE

## 2023-02-09 PROCEDURE — 94761 N-INVAS EAR/PLS OXIMETRY MLT: CPT

## 2023-02-09 RX ADMIN — SODIUM CHLORIDE: 9 INJECTION, SOLUTION INTRAVENOUS at 05:02

## 2023-02-09 RX ADMIN — KETOROLAC TROMETHAMINE 15 MG: 30 INJECTION, SOLUTION INTRAMUSCULAR; INTRAVENOUS at 05:02

## 2023-02-09 RX ADMIN — PROMETHAZINE HYDROCHLORIDE 25 MG: 25 TABLET ORAL at 06:02

## 2023-02-09 RX ADMIN — ENOXAPARIN SODIUM 40 MG: 40 INJECTION SUBCUTANEOUS at 05:02

## 2023-02-09 NOTE — HOSPITAL COURSE
2/9/23-Patient is doing well but still has NG tube in place.  Surgery is managing NG tube and SBO.  Patient has gotten relief from the tube.  Otherwise she voices no other complaints.    2/10/23-Patient is tolerating a clear liquid diet at this time.  Up to surgery as to when she can be advanced.  She still has some mild abdominal discomfort.  She does have flatus but no BM's thus far.    2/11/23-Patient is doing well.  She is tolerating a regular diet at this time.  She only states minimal pain.  Examine is stable.  She is stable for discharge home today.

## 2023-02-09 NOTE — PROGRESS NOTES
[facility]  Medical Surgical Unit  General Surgery  Progress Note    Subjective:     Interval History: NO acute events reported.  Patient passed flatus.  No bowel movements.  Minimal NGT output.    Post-Op Info:  * No surgery found *          Medications:  Continuous Infusions:   sodium chloride 0.9% 100 mL/hr at 02/08/23 0230     Scheduled Meds:   enoxaparin  40 mg Subcutaneous Daily     PRN Meds:acetaminophen, aluminum-magnesium hydroxide-simethicone, bisacodyL, dextrose 10%, dextrose 10%, dextrose 50%, dextrose 50%, glucagon (human recombinant), glucose, glucose, hydrALAZINE, ketorolac, melatonin, ondansetron, promethazine, sodium chloride 0.9%     Objective:     Vital Signs (Most Recent):  Temp: 97.5 °F (36.4 °C) (02/09/23 0742)  Pulse: 64 (02/09/23 0742)  Resp: 18 (02/08/23 2000)  BP: 128/79 (02/09/23 0742)  SpO2: 98 % (02/09/23 0844) Vital Signs (24h Range):  Temp:  [97.5 °F (36.4 °C)-98.5 °F (36.9 °C)] 97.5 °F (36.4 °C)  Pulse:  [55-66] 64  Resp:  [18] 18  SpO2:  [96 %-100 %] 98 %  BP: (104-131)/(65-79) 128/79       Intake/Output Summary (Last 24 hours) at 2/9/2023 1013  Last data filed at 2/9/2023 0039  Gross per 24 hour   Intake --   Output 1000 ml   Net -1000 ml       Physical Exam  Abdominal:      General: Abdomen is flat. There is no distension.      Palpations: Abdomen is soft. There is no mass.      Tenderness: There is no abdominal tenderness. There is no guarding or rebound.      Hernia: No hernia is present.      Comments: Benign exam.       Significant Labs:  CBC:   Recent Labs   Lab 02/08/23  0502   WBC 5.1   RBC 3.82*   HGB 10.5*   HCT 34.3*      MCV 89.8   MCH 27.5   MCHC 30.6*     CMP:   Recent Labs   Lab 02/08/23  0502   CALCIUM 9.0   ALBUMIN 3.4*      K 4.2   CO2 24   BUN 15.0   CREATININE 0.72   ALKPHOS 57   ALT 8   AST 12   BILITOT 0.3       Significant Diagnostics: Xray abd:  Normal bowel gas pattern.  None    Assessment/Plan:     Active Diagnoses:    Diagnosis  Date Noted POA    PRINCIPAL PROBLEM:  Small bowel obstruction [K56.609] 02/08/2023 Unknown    Hypertension [I10] 07/28/2022 Yes    Depression [F32.A] 07/28/2022 Yes      Problems Resolved During this Admission:   DC ngt but remain NPO till AM  Encourage ambulation  Will attempt clear liquids tomorrow.      Summer Munroe MD  General Surgery  [facility] - Medical Surgical Unit

## 2023-02-09 NOTE — PLAN OF CARE
Pt lying in bed quietly with eyes closed. Easily aroused to verbal and tactile stimuli. Resp even and unlabored. BS active x4 quads. No c/o pain upon palpation of abdomen. C/o pain to abdomen feels like knife stabbing states the patient prn medication will be given. Pt c/o nausea no emesis at this time prn promethazine given.

## 2023-02-09 NOTE — PROGRESS NOTES
"Inpatient Nutrition Evaluation    Admit Date: 2/7/2023   Total duration of encounter: 2 days    Nutrition Recommendation/Prescription     When able to lift NPO status, pt may benefit from GI Soft Diet as tolerated.   Monitor NPO status, weight, and labs.     RD available as needed.  Thank  you.     Nutrition Assessment     Chart Review    Reason Seen: continuous nutrition monitoring    Malnutrition Screening Tool Results   Have you recently lost weight without trying?: No  Have you been eating poorly because of a decreased appetite?: No   MST Score: 0     Diagnosis:  Small Bowel Obstruction.     Relevant Medical History:   Depression      History of colon cancer      Hypertension          Nutrition-Related Medications:   Lovenox.    Nutrition-Related Labs:  2/9: No new labs.     Diet Order: Diet NPO  Oral Supplement Order: none  Appetite/Oral Intake: NPO/NPO  Factors Affecting Nutritional Intake: NPO  Food/Quaker/Cultural Preferences: none reported  Food Allergies: none reported       Wound(s):       Comments    2/9: Pt currently NPO with NGT to suction. No recent weight loss noted/reported. Labs and meds reviewed. Will continue to monitor during stay.     Anthropometrics    Height: 5' 5" (165.1 cm) Height Method: Stated  Last Weight: 70.8 kg (156 lb) (02/07/23 1802) Weight Method: Standard Scale  BMI (Calculated): 26  BMI Classification: overweight (BMI 25-29.9)     Ideal Body Weight (IBW), Female: 125 lb     % Ideal Body Weight, Female (lb): 124.8 %                             Usual Weight Provided By: EMR weight history    Wt Readings from Last 3 Encounters:   02/07/23 1802 70.8 kg (156 lb)   07/28/22 1650 67.7 kg (149 lb 3.2 oz)      Weight Change(s) Since Admission:  Admit Weight: 70.8 kg (156 lb) (02/07/23 1802)      Patient Education    Not applicable.    Monitoring & Evaluation     Dietitian will monitor food and beverage intake, energy intake, weight, electrolyte/renal panel, glucose/endocrine profile, " and gastrointestinal profile.  Nutrition Risk/Follow-Up: low (follow-up in 5-7 days)  Patients assigned 'low nutrition risk' status do not qualify for a full nutritional assessment but will be monitored and re-evaluated in a 5-7 day time period. Please consult if re-evaluation needed sooner.

## 2023-02-09 NOTE — PROGRESS NOTES
[facility]  Hospital Medicine  Progress Note    Patient Name: Yessy Valencia  MRN: [id number]  Patient Class: IP- Inpatient   Admission Date: 2/7/2023  Length of Stay: 0 days  Attending Physician: Micheal Gonzalez MD  Primary Care Provider: Keisha Torres MD        Subjective:     Principal Problem:Small bowel obstruction        HPI:  Ms. Valencia is a 58 year old  female with a history of colon cancer s/p colon resection in 2003, HTN, and depression who presented to the ER today with acute onset of abdominal pain. She was in her normal state of health until yesterday afternoon at 3 pm when she developed nonradiating epigastric abdominal pain 10/10 in severity, chills, and nausea. She denies any vomiting, fevers, chest pain, or shortness of breath and her last bowel movement was on Monday, 2/6/2023. Her abdominal pain persisted which is what prompted her to come to [facility] for further evaluation. On arrival to the ER she was hypertensive with a blood pressure of 159/90 and her initial labwork was unremarkable. CT of the abdomen and pelvis showed findings suspicious for a partial or early complete small bowel obstruction with transition point in either the jejunum or proximal ileum and she had an NG tube placed in the ER. She has received zosyn 4.5 gm IV, fentanyl 100 mcg IV, zofran 4 mg IV, and 1 liter of IV fluids in the ER but she still complains of abdominal pain 9/10 in severity. She is otherwise in stable condition and she has no other complaints today.      Overview/Hospital Course:  2/9/23-Patient is doing well but still has NG tube in place.  Surgery is managing NG tube and SBO.  Patient has gotten relief from the tube.  Otherwise she voices no other complaints.      Interval History:     Review of Systems   Constitutional:  Positive for activity change and fatigue.   HENT: Negative.     Eyes: Negative.    Respiratory: Negative.      Cardiovascular: Negative.    Gastrointestinal:  Positive for abdominal pain.   Endocrine: Negative.    Genitourinary: Negative.    Musculoskeletal: Negative.    Skin: Negative.    Neurological: Negative.    Hematological: Negative.    Psychiatric/Behavioral: Negative.     Objective:     Vital Signs (Most Recent):  Temp: 97.7 °F (36.5 °C) (02/09/23 1213)  Pulse: 70 (02/09/23 1213)  Resp: 18 (02/08/23 2000)  BP: 124/77 (02/09/23 1213)  SpO2: 100 % (02/09/23 1213) Vital Signs (24h Range):  Temp:  [97.5 °F (36.4 °C)-98.5 °F (36.9 °C)] 97.7 °F (36.5 °C)  Pulse:  [55-70] 70  Resp:  [18] 18  SpO2:  [96 %-100 %] 100 %  BP: (104-131)/(65-79) 124/77     Weight: 70.8 kg (156 lb)  Body mass index is 25.96 kg/m².    Intake/Output Summary (Last 24 hours) at 2/9/2023 1410  Last data filed at 2/9/2023 0039  Gross per 24 hour   Intake --   Output 1000 ml   Net -1000 ml      Physical Exam  Constitutional:       Appearance: Normal appearance. She is normal weight.   HENT:      Head: Normocephalic and atraumatic.      Nose: Nose normal.      Mouth/Throat:      Mouth: Mucous membranes are dry.      Pharynx: Oropharynx is clear.   Eyes:      Extraocular Movements: Extraocular movements intact.      Conjunctiva/sclera: Conjunctivae normal.      Pupils: Pupils are equal, round, and reactive to light.   Cardiovascular:      Rate and Rhythm: Normal rate and regular rhythm.      Pulses: Normal pulses.      Heart sounds: Normal heart sounds.   Pulmonary:      Effort: Pulmonary effort is normal.      Breath sounds: Normal breath sounds.   Abdominal:      General: Abdomen is flat. Bowel sounds are absent.      Tenderness: There is abdominal tenderness.      Comments: NG tube in place   Musculoskeletal:         General: Normal range of motion.      Cervical back: Normal range of motion and neck supple.   Skin:     General: Skin is warm and dry.      Capillary Refill: Capillary refill takes 2 to 3 seconds.   Neurological:      General: No focal  deficit present.      Mental Status: She is alert and oriented to person, place, and time. Mental status is at baseline.   Psychiatric:         Mood and Affect: Mood normal.         Behavior: Behavior normal.         Thought Content: Thought content normal.         Judgment: Judgment normal.       Significant Labs: All pertinent labs within the past 24 hours have been reviewed.  BMP:   Recent Labs   Lab 02/08/23  0502      K 4.2   CO2 24   BUN 15.0   CREATININE 0.72   CALCIUM 9.0   MG 1.90     CBC:   Recent Labs   Lab 02/07/23  1832 02/08/23  0502   WBC 6.1 5.1   HGB 11.4* 10.5*   HCT 36.2* 34.3*    214     CMP:   Recent Labs   Lab 02/07/23  1832 02/08/23  0502    140   K 4.2 4.2   CO2 29 24   BUN 20.0 15.0   CREATININE 0.85 0.72   CALCIUM 9.9 9.0   ALBUMIN 4.0 3.4*   BILITOT 0.5 0.3   ALKPHOS 65 57   AST 13 12   ALT 10 8     Magnesium:   Recent Labs   Lab 02/08/23  0502   MG 1.90       Significant Imaging: I have reviewed all pertinent imaging results/findings within the past 24 hours.      Assessment/Plan:      * Small bowel obstruction  Continue NG tube for decompression, NPO, pain control, IV fluids, and as needed antiemetics. Consult general surgery.    Hypertension  Continue IV hydralazine as needed. Hold lisinopril while NPO.    Depression  Hold amitriptyline while NPO.       VTE Risk Mitigation (From admission, onward)         Ordered     enoxaparin injection 40 mg  Daily         02/08/23 0129     IP VTE HIGH RISK PATIENT  Once         02/08/23 0129              Follow labs  NG tube  Surgery following  ambulate  OOB  Discharge Planning   LINDSAY:      Code Status: Full Code   Is the patient medically ready for discharge?:     Reason for patient still in hospital (select all that apply): Laboratory test, Treatment, Consult recommendations and PT / OT recommendations                     Archie Reyes MD  Department of Hospital Medicine   [facility]

## 2023-02-09 NOTE — SUBJECTIVE & OBJECTIVE
Interval History:     Review of Systems   Constitutional:  Positive for activity change and fatigue.   HENT: Negative.     Eyes: Negative.    Respiratory: Negative.     Cardiovascular: Negative.    Gastrointestinal:  Positive for abdominal pain.   Endocrine: Negative.    Genitourinary: Negative.    Musculoskeletal: Negative.    Skin: Negative.    Neurological: Negative.    Hematological: Negative.    Psychiatric/Behavioral: Negative.     Objective:     Vital Signs (Most Recent):  Temp: 97.7 °F (36.5 °C) (02/09/23 1213)  Pulse: 70 (02/09/23 1213)  Resp: 18 (02/08/23 2000)  BP: 124/77 (02/09/23 1213)  SpO2: 100 % (02/09/23 1213) Vital Signs (24h Range):  Temp:  [97.5 °F (36.4 °C)-98.5 °F (36.9 °C)] 97.7 °F (36.5 °C)  Pulse:  [55-70] 70  Resp:  [18] 18  SpO2:  [96 %-100 %] 100 %  BP: (104-131)/(65-79) 124/77     Weight: 70.8 kg (156 lb)  Body mass index is 25.96 kg/m².    Intake/Output Summary (Last 24 hours) at 2/9/2023 1410  Last data filed at 2/9/2023 0039  Gross per 24 hour   Intake --   Output 1000 ml   Net -1000 ml      Physical Exam  Constitutional:       Appearance: Normal appearance. She is normal weight.   HENT:      Head: Normocephalic and atraumatic.      Nose: Nose normal.      Mouth/Throat:      Mouth: Mucous membranes are dry.      Pharynx: Oropharynx is clear.   Eyes:      Extraocular Movements: Extraocular movements intact.      Conjunctiva/sclera: Conjunctivae normal.      Pupils: Pupils are equal, round, and reactive to light.   Cardiovascular:      Rate and Rhythm: Normal rate and regular rhythm.      Pulses: Normal pulses.      Heart sounds: Normal heart sounds.   Pulmonary:      Effort: Pulmonary effort is normal.      Breath sounds: Normal breath sounds.   Abdominal:      General: Abdomen is flat. Bowel sounds are absent.      Tenderness: There is abdominal tenderness.      Comments: NG tube in place   Musculoskeletal:         General: Normal range of motion.      Cervical back: Normal range of  motion and neck supple.   Skin:     General: Skin is warm and dry.      Capillary Refill: Capillary refill takes 2 to 3 seconds.   Neurological:      General: No focal deficit present.      Mental Status: She is alert and oriented to person, place, and time. Mental status is at baseline.   Psychiatric:         Mood and Affect: Mood normal.         Behavior: Behavior normal.         Thought Content: Thought content normal.         Judgment: Judgment normal.       Significant Labs: All pertinent labs within the past 24 hours have been reviewed.  BMP:   Recent Labs   Lab 02/08/23  0502      K 4.2   CO2 24   BUN 15.0   CREATININE 0.72   CALCIUM 9.0   MG 1.90     CBC:   Recent Labs   Lab 02/07/23  1832 02/08/23  0502   WBC 6.1 5.1   HGB 11.4* 10.5*   HCT 36.2* 34.3*    214     CMP:   Recent Labs   Lab 02/07/23  1832 02/08/23  0502    140   K 4.2 4.2   CO2 29 24   BUN 20.0 15.0   CREATININE 0.85 0.72   CALCIUM 9.9 9.0   ALBUMIN 4.0 3.4*   BILITOT 0.5 0.3   ALKPHOS 65 57   AST 13 12   ALT 10 8     Magnesium:   Recent Labs   Lab 02/08/23  0502   MG 1.90       Significant Imaging: I have reviewed all pertinent imaging results/findings within the past 24 hours.

## 2023-02-10 LAB
ALBUMIN SERPL-MCNC: 3.1 G/DL (ref 3.5–5)
ALBUMIN/GLOB SERPL: 1.1 RATIO (ref 1.1–2)
ALP SERPL-CCNC: 53 UNIT/L (ref 40–150)
ALT SERPL-CCNC: 7 UNIT/L (ref 0–55)
AST SERPL-CCNC: 12 UNIT/L (ref 5–34)
BASOPHILS # BLD AUTO: 0.03 X10(3)/MCL (ref 0–0.2)
BASOPHILS NFR BLD AUTO: 0.6 %
BILIRUBIN DIRECT+TOT PNL SERPL-MCNC: 0.6 MG/DL
BUN SERPL-MCNC: 20 MG/DL (ref 9.8–20.1)
CALCIUM SERPL-MCNC: 8.6 MG/DL (ref 8.4–10.2)
CHLORIDE SERPL-SCNC: 112 MMOL/L (ref 98–107)
CO2 SERPL-SCNC: 21 MMOL/L (ref 22–29)
CREAT SERPL-MCNC: 0.6 MG/DL (ref 0.55–1.02)
EOSINOPHIL # BLD AUTO: 0.14 X10(3)/MCL (ref 0–0.9)
EOSINOPHIL NFR BLD AUTO: 2.8 %
ERYTHROCYTE [DISTWIDTH] IN BLOOD BY AUTOMATED COUNT: 13.2 % (ref 11.5–17)
GFR SERPLBLD CREATININE-BSD FMLA CKD-EPI: >60 MLS/MIN/1.73/M2
GLOBULIN SER-MCNC: 2.9 GM/DL (ref 2.4–3.5)
GLUCOSE SERPL-MCNC: 56 MG/DL (ref 74–100)
HCT VFR BLD AUTO: 33.3 % (ref 37–47)
HGB BLD-MCNC: 10.4 GM/DL (ref 12–16)
IMM GRANULOCYTES # BLD AUTO: 0.01 X10(3)/MCL (ref 0–0.04)
IMM GRANULOCYTES NFR BLD AUTO: 0.2 %
LYMPHOCYTES # BLD AUTO: 1.59 X10(3)/MCL (ref 0.6–4.6)
LYMPHOCYTES NFR BLD AUTO: 32 %
MAGNESIUM SERPL-MCNC: 1.7 MG/DL (ref 1.6–2.6)
MCH RBC QN AUTO: 28.1 PG
MCHC RBC AUTO-ENTMCNC: 31.2 MG/DL (ref 33–36)
MCV RBC AUTO: 90 FL (ref 80–94)
MONOCYTES # BLD AUTO: 0.34 X10(3)/MCL (ref 0.1–1.3)
MONOCYTES NFR BLD AUTO: 6.8 %
NEUTROPHILS # BLD AUTO: 2.86 X10(3)/MCL (ref 2.1–9.2)
NEUTROPHILS NFR BLD AUTO: 57.6 %
PLATELET # BLD AUTO: 194 X10(3)/MCL (ref 130–400)
PMV BLD AUTO: 10.1 FL (ref 7.4–10.4)
POTASSIUM SERPL-SCNC: 3.6 MMOL/L (ref 3.5–5.1)
PROT SERPL-MCNC: 6 GM/DL (ref 6.4–8.3)
RBC # BLD AUTO: 3.7 X10(6)/MCL (ref 4.2–5.4)
SODIUM SERPL-SCNC: 142 MMOL/L (ref 136–145)
WBC # SPEC AUTO: 5 X10(3)/MCL (ref 4.5–11.5)

## 2023-02-10 PROCEDURE — 83735 ASSAY OF MAGNESIUM: CPT | Performed by: INTERNAL MEDICINE

## 2023-02-10 PROCEDURE — 63600175 PHARM REV CODE 636 W HCPCS: Performed by: INTERNAL MEDICINE

## 2023-02-10 PROCEDURE — 80053 COMPREHEN METABOLIC PANEL: CPT | Performed by: INTERNAL MEDICINE

## 2023-02-10 PROCEDURE — 11000001 HC ACUTE MED/SURG PRIVATE ROOM

## 2023-02-10 PROCEDURE — 94761 N-INVAS EAR/PLS OXIMETRY MLT: CPT

## 2023-02-10 PROCEDURE — 25000003 PHARM REV CODE 250: Performed by: INTERNAL MEDICINE

## 2023-02-10 PROCEDURE — 99900035 HC TECH TIME PER 15 MIN (STAT)

## 2023-02-10 PROCEDURE — 85025 COMPLETE CBC W/AUTO DIFF WBC: CPT | Performed by: INTERNAL MEDICINE

## 2023-02-10 RX ADMIN — SODIUM CHLORIDE: 9 INJECTION, SOLUTION INTRAVENOUS at 02:02

## 2023-02-10 RX ADMIN — KETOROLAC TROMETHAMINE 15 MG: 30 INJECTION, SOLUTION INTRAMUSCULAR; INTRAVENOUS at 02:02

## 2023-02-10 RX ADMIN — ENOXAPARIN SODIUM 40 MG: 40 INJECTION SUBCUTANEOUS at 05:02

## 2023-02-10 RX ADMIN — ONDANSETRON 4 MG: 2 INJECTION INTRAMUSCULAR; INTRAVENOUS at 01:02

## 2023-02-10 NOTE — PROGRESS NOTES
[facility]  Hospital Medicine  Progress Note    Patient Name: Yessy Valencia  MRN: [id number]  Patient Class: IP- Inpatient   Admission Date: 2/7/2023  Length of Stay: 1 days  Attending Physician: Micheal Gonzalez MD  Primary Care Provider: Keisha Torres MD        Subjective:     Principal Problem:Small bowel obstruction        HPI:  Ms. Valencia is a 58 year old  female with a history of colon cancer s/p colon resection in 2003, HTN, and depression who presented to the ER today with acute onset of abdominal pain. She was in her normal state of health until yesterday afternoon at 3 pm when she developed nonradiating epigastric abdominal pain 10/10 in severity, chills, and nausea. She denies any vomiting, fevers, chest pain, or shortness of breath and her last bowel movement was on Monday, 2/6/2023. Her abdominal pain persisted which is what prompted her to come to [facility] for further evaluation. On arrival to the ER she was hypertensive with a blood pressure of 159/90 and her initial labwork was unremarkable. CT of the abdomen and pelvis showed findings suspicious for a partial or early complete small bowel obstruction with transition point in either the jejunum or proximal ileum and she had an NG tube placed in the ER. She has received zosyn 4.5 gm IV, fentanyl 100 mcg IV, zofran 4 mg IV, and 1 liter of IV fluids in the ER but she still complains of abdominal pain 9/10 in severity. She is otherwise in stable condition and she has no other complaints today.      Overview/Hospital Course:  2/9/23-Patient is doing well but still has NG tube in place.  Surgery is managing NG tube and SBO.  Patient has gotten relief from the tube.  Otherwise she voices no other complaints.    2/10/23-Patient is tolerating a clear liquid diet at this time.  Up to surgery as to when she can be advanced.  She still has some mild abdominal discomfort.  She does have flatus  but no BM's thus far.      Interval History:     Review of Systems   Constitutional: Negative.    HENT: Negative.     Eyes: Negative.    Respiratory: Negative.     Cardiovascular: Negative.    Gastrointestinal:  Positive for abdominal pain.   Endocrine: Negative.    Genitourinary: Negative.    Musculoskeletal: Negative.    Skin: Negative.    Allergic/Immunologic: Negative.    Neurological: Negative.    Hematological: Negative.    Psychiatric/Behavioral: Negative.     Objective:     Vital Signs (Most Recent):  Temp: 97.9 °F (36.6 °C) (02/10/23 1208)  Pulse: 67 (02/10/23 1208)  Resp: 18 (02/10/23 1208)  BP: 132/74 (02/10/23 1208)  SpO2: 100 % (02/10/23 1208) Vital Signs (24h Range):  Temp:  [97.4 °F (36.3 °C)-97.9 °F (36.6 °C)] 97.9 °F (36.6 °C)  Pulse:  [57-69] 67  Resp:  [18] 18  SpO2:  [98 %-100 %] 100 %  BP: (127-139)/(62-83) 132/74     Weight: 70.8 kg (156 lb)  Body mass index is 25.96 kg/m².    Intake/Output Summary (Last 24 hours) at 2/10/2023 1403  Last data filed at 2/10/2023 1231  Gross per 24 hour   Intake 360 ml   Output --   Net 360 ml      Physical Exam  Constitutional:       Appearance: Normal appearance. She is normal weight.   HENT:      Head: Normocephalic and atraumatic.      Nose: Nose normal.      Mouth/Throat:      Mouth: Mucous membranes are moist.      Pharynx: Oropharynx is clear.   Eyes:      Extraocular Movements: Extraocular movements intact.      Conjunctiva/sclera: Conjunctivae normal.      Pupils: Pupils are equal, round, and reactive to light.   Cardiovascular:      Rate and Rhythm: Normal rate and regular rhythm.      Pulses: Normal pulses.      Heart sounds: Normal heart sounds.   Pulmonary:      Effort: Pulmonary effort is normal.      Breath sounds: Normal breath sounds.   Abdominal:      General: Bowel sounds are decreased.      Palpations: Abdomen is soft.   Musculoskeletal:         General: Normal range of motion.      Cervical back: Normal range of motion and neck supple.    Skin:     General: Skin is warm and dry.      Capillary Refill: Capillary refill takes 2 to 3 seconds.   Neurological:      General: No focal deficit present.      Mental Status: She is alert and oriented to person, place, and time. Mental status is at baseline.   Psychiatric:         Mood and Affect: Mood normal.         Behavior: Behavior normal.         Thought Content: Thought content normal.         Judgment: Judgment normal.       Significant Labs: All pertinent labs within the past 24 hours have been reviewed.  BMP:   Recent Labs   Lab 02/10/23  0310      K 3.6   CO2 21*   BUN 20.0   CREATININE 0.60   CALCIUM 8.6   MG 1.70     CBC:   Recent Labs   Lab 02/10/23  0310   WBC 5.0   HGB 10.4*   HCT 33.3*        CMP:   Recent Labs   Lab 02/10/23  0310      K 3.6   CO2 21*   BUN 20.0   CREATININE 0.60   CALCIUM 8.6   ALBUMIN 3.1*   BILITOT 0.6   ALKPHOS 53   AST 12   ALT 7     Magnesium:   Recent Labs   Lab 02/10/23  0310   MG 1.70       Significant Imaging: I have reviewed all pertinent imaging results/findings within the past 24 hours.      Assessment/Plan:      * Small bowel obstruction  Continue NG tube for decompression, NPO, pain control, IV fluids, and as needed antiemetics. Consult general surgery.    Hypertension  Continue IV hydralazine as needed. Hold lisinopril while NPO.    Depression  Hold amitriptyline while NPO.       VTE Risk Mitigation (From admission, onward)         Ordered     enoxaparin injection 40 mg  Daily         02/08/23 0129     IP VTE HIGH RISK PATIENT  Once         02/08/23 0129              Diet as per surgery  Labs as needed  DVT prophylaxis  OOB  Ambulate  D/c soon  Discharge Planning   LINDSAY:      Code Status: Full Code   Is the patient medically ready for discharge?:     Reason for patient still in hospital (select all that apply): Treatment, Consult recommendations and Pending disposition                     Archie Reyes MD  Department of Hospital  Medicine   [facility]

## 2023-02-10 NOTE — SUBJECTIVE & OBJECTIVE
Interval History:     Review of Systems   Constitutional: Negative.    HENT: Negative.     Eyes: Negative.    Respiratory: Negative.     Cardiovascular: Negative.    Gastrointestinal:  Positive for abdominal pain.   Endocrine: Negative.    Genitourinary: Negative.    Musculoskeletal: Negative.    Skin: Negative.    Allergic/Immunologic: Negative.    Neurological: Negative.    Hematological: Negative.    Psychiatric/Behavioral: Negative.     Objective:     Vital Signs (Most Recent):  Temp: 97.9 °F (36.6 °C) (02/10/23 1208)  Pulse: 67 (02/10/23 1208)  Resp: 18 (02/10/23 1208)  BP: 132/74 (02/10/23 1208)  SpO2: 100 % (02/10/23 1208) Vital Signs (24h Range):  Temp:  [97.4 °F (36.3 °C)-97.9 °F (36.6 °C)] 97.9 °F (36.6 °C)  Pulse:  [57-69] 67  Resp:  [18] 18  SpO2:  [98 %-100 %] 100 %  BP: (127-139)/(62-83) 132/74     Weight: 70.8 kg (156 lb)  Body mass index is 25.96 kg/m².    Intake/Output Summary (Last 24 hours) at 2/10/2023 1403  Last data filed at 2/10/2023 1231  Gross per 24 hour   Intake 360 ml   Output --   Net 360 ml      Physical Exam  Constitutional:       Appearance: Normal appearance. She is normal weight.   HENT:      Head: Normocephalic and atraumatic.      Nose: Nose normal.      Mouth/Throat:      Mouth: Mucous membranes are moist.      Pharynx: Oropharynx is clear.   Eyes:      Extraocular Movements: Extraocular movements intact.      Conjunctiva/sclera: Conjunctivae normal.      Pupils: Pupils are equal, round, and reactive to light.   Cardiovascular:      Rate and Rhythm: Normal rate and regular rhythm.      Pulses: Normal pulses.      Heart sounds: Normal heart sounds.   Pulmonary:      Effort: Pulmonary effort is normal.      Breath sounds: Normal breath sounds.   Abdominal:      General: Bowel sounds are decreased.      Palpations: Abdomen is soft.   Musculoskeletal:         General: Normal range of motion.      Cervical back: Normal range of motion and neck supple.   Skin:     General: Skin is warm  and dry.      Capillary Refill: Capillary refill takes 2 to 3 seconds.   Neurological:      General: No focal deficit present.      Mental Status: She is alert and oriented to person, place, and time. Mental status is at baseline.   Psychiatric:         Mood and Affect: Mood normal.         Behavior: Behavior normal.         Thought Content: Thought content normal.         Judgment: Judgment normal.       Significant Labs: All pertinent labs within the past 24 hours have been reviewed.  BMP:   Recent Labs   Lab 02/10/23  0310      K 3.6   CO2 21*   BUN 20.0   CREATININE 0.60   CALCIUM 8.6   MG 1.70     CBC:   Recent Labs   Lab 02/10/23  0310   WBC 5.0   HGB 10.4*   HCT 33.3*        CMP:   Recent Labs   Lab 02/10/23  0310      K 3.6   CO2 21*   BUN 20.0   CREATININE 0.60   CALCIUM 8.6   ALBUMIN 3.1*   BILITOT 0.6   ALKPHOS 53   AST 12   ALT 7     Magnesium:   Recent Labs   Lab 02/10/23  0310   MG 1.70       Significant Imaging: I have reviewed all pertinent imaging results/findings within the past 24 hours.

## 2023-02-10 NOTE — PLAN OF CARE
Pt lying in bed alert and awake. Pt has no c/o any pain at this time. Pt noted in haapy mood with wonderful generous spirit. NGT removed today pt has no nausea at this time. Pt remains passing flatus still no bowel movement yet. Will continue to monitor.

## 2023-02-11 VITALS
SYSTOLIC BLOOD PRESSURE: 140 MMHG | HEART RATE: 54 BPM | BODY MASS INDEX: 25.99 KG/M2 | DIASTOLIC BLOOD PRESSURE: 78 MMHG | HEIGHT: 65 IN | RESPIRATION RATE: 18 BRPM | OXYGEN SATURATION: 98 % | TEMPERATURE: 98 F | WEIGHT: 156 LBS

## 2023-02-11 RX ORDER — TRAMADOL HYDROCHLORIDE 50 MG/1
50 TABLET ORAL EVERY 6 HOURS
Qty: 20 TABLET | Refills: 0 | Status: SHIPPED | OUTPATIENT
Start: 2023-02-11 | End: 2023-02-21

## 2023-02-11 NOTE — PLAN OF CARE
Pt lying in bed alert and awake watching television. No c/o any pain at this time. No c/o n/v at this time. Pt tolerated diet today. No c/o any discomfort. Continues to pass flatus and had x1 bowel movement today. Will continue to monitor for changes.

## 2023-02-11 NOTE — DISCHARGE SUMMARY
[facility]  Hospital Medicine  Discharge Summary      Patient Name: Yessy Valencia  MRN: [id number]  [facility]: [id number]  Patient Class: IP- Inpatient  Admission Date: 2/7/2023  Hospital Length of Stay: 2 days  Discharge Date and Time:  02/11/2023 12:22 PM  Attending Physician: Micheal Gonzalez MD   Discharging Provider: Archie Reyes MD  Primary Care Provider: Keisha Torres MD    Primary Care Team: Networked reference to record PCT     HPI:   Ms. Valencia is a 58 year old  female with a history of colon cancer s/p colon resection in 2003, HTN, and depression who presented to the ER today with acute onset of abdominal pain. She was in her normal state of health until yesterday afternoon at 3 pm when she developed nonradiating epigastric abdominal pain 10/10 in severity, chills, and nausea. She denies any vomiting, fevers, chest pain, or shortness of breath and her last bowel movement was on Monday, 2/6/2023. Her abdominal pain persisted which is what prompted her to come to [facility] for further evaluation. On arrival to the ER she was hypertensive with a blood pressure of 159/90 and her initial labwork was unremarkable. CT of the abdomen and pelvis showed findings suspicious for a partial or early complete small bowel obstruction with transition point in either the jejunum or proximal ileum and she had an NG tube placed in the ER. She has received zosyn 4.5 gm IV, fentanyl 100 mcg IV, zofran 4 mg IV, and 1 liter of IV fluids in the ER but she still complains of abdominal pain 9/10 in severity. She is otherwise in stable condition and she has no other complaints today.      * No surgery found *      Hospital Course:   2/9/23-Patient is doing well but still has NG tube in place.  Surgery is managing NG tube and SBO.  Patient has gotten relief from the tube.  Otherwise she voices no other complaints.    2/10/23-Patient is tolerating a clear liquid  diet at this time.  Up to surgery as to when she can be advanced.  She still has some mild abdominal discomfort.  She does have flatus but no BM's thus far.    2/11/23-Patient is doing well.  She is tolerating a regular diet at this time.  She only states minimal pain.  Examine is stable.  She is stable for discharge home today.       Goals of Care Treatment Preferences:  Code Status: Full Code      Consults:   Consults (From admission, onward)        Status Ordering Provider     Inpatient consult to General Surgery  Once        Provider:  Summer Munroe MD    Acknowledged DAVID DEL ROSARIO          No new Assessment & Plan notes have been filed under this hospital service since the last note was generated.  Service: Hospital Medicine    Final Active Diagnoses:    Diagnosis Date Noted POA    PRINCIPAL PROBLEM:  Small bowel obstruction [K56.609] 02/08/2023 Unknown    Hypertension [I10] 07/28/2022 Yes    Depression [F32.A] 07/28/2022 Yes      Problems Resolved During this Admission:       Discharged Condition: stable    Disposition: Home or Self Care    Follow Up:   Follow-up Information     Keisha Torres MD Follow up in 1 week(s).    Specialty: Family Medicine  Contact information:  [address] [place] [address]  [phone]                       Patient Instructions:   No discharge procedures on file.    Significant Diagnostic Studies: Labs:   BMP:   Recent Labs   Lab 02/10/23  0310      K 3.6   CO2 21*   BUN 20.0   CREATININE 0.60   CALCIUM 8.6   MG 1.70   , CMP   Recent Labs   Lab 02/10/23  0310      K 3.6   CO2 21*   BUN 20.0   CREATININE 0.60   CALCIUM 8.6   ALBUMIN 3.1*   BILITOT 0.6   ALKPHOS 53   AST 12   ALT 7    and CBC   Recent Labs   Lab 02/10/23  0310   WBC 5.0   HGB 10.4*   HCT 33.3*          Pending Diagnostic Studies:     None         Medications:  Reconciled Home Medications:      Medication List      START taking these medications    traMADoL 50 mg tablet  Commonly known  as: ULTRAM  Take 1 tablet (50 mg total) by mouth every 6 (six) hours. for 10 days        CONTINUE taking these medications    amitriptyline 10 MG tablet  Commonly known as: ELAVIL  Take 10 mg by mouth every evening.     lisinopriL 10 MG tablet  Take 1 tablet (10 mg total) by mouth once daily.     nystatin 100,000 unit/mL suspension  Commonly known as: MYCOSTATIN  Take 10 mLs by mouth 4 (four) times daily.            Indwelling Lines/Drains at time of discharge:   Lines/Drains/Airways     None                 Time spent on the discharge of patient: 34 minutes         Archie Reyes MD  Department of Hospital Medicine  [facility]

## 2023-05-18 ENCOUNTER — HOSPITAL ENCOUNTER (EMERGENCY)
Facility: HOSPITAL | Age: 58
Discharge: HOME | End: 2023-05-19
Attending: FAMILY MEDICINE
Payer: COMMERCIAL

## 2023-05-18 ENCOUNTER — HOSPITAL ENCOUNTER (OUTPATIENT)
Dept: RADIOLOGY | Facility: HOSPITAL | Age: 58
Discharge: HOME | End: 2023-05-18
Attending: FAMILY MEDICINE
Payer: COMMERCIAL

## 2023-05-18 DIAGNOSIS — R10.9 ABDOMINAL PAIN: ICD-10-CM

## 2023-05-18 DIAGNOSIS — R06.00 DYSPNEA: ICD-10-CM

## 2023-05-18 DIAGNOSIS — R10.32 LEFT LOWER QUADRANT ABDOMINAL PAIN: Primary | ICD-10-CM

## 2023-05-18 LAB
ABS NEUT CALC (OHS): 3.93 X10(3)/MCL (ref 2.1–9.2)
ALBUMIN SERPL-MCNC: 4.2 G/DL (ref 3.5–5)
ALBUMIN/GLOB SERPL: 1.1 RATIO (ref 1.1–2)
ALP SERPL-CCNC: 74 UNIT/L (ref 40–150)
ALT SERPL-CCNC: 14 UNIT/L (ref 0–55)
APPEARANCE UR: CLEAR
AST SERPL-CCNC: 16 UNIT/L (ref 5–34)
BACTERIA #/AREA URNS AUTO: ABNORMAL /HPF
BASOPHILS NFR BLD MANUAL: 0.06 X10(3)/MCL (ref 0–0.2)
BASOPHILS NFR BLD MANUAL: 1 % (ref 0–2)
BILIRUB UR QL STRIP.AUTO: NEGATIVE MG/DL
BILIRUBIN DIRECT+TOT PNL SERPL-MCNC: 0.4 MG/DL
BUN SERPL-MCNC: 22.8 MG/DL (ref 9.8–20.1)
CALCIUM SERPL-MCNC: 9.4 MG/DL (ref 8.4–10.2)
CHLORIDE SERPL-SCNC: 104 MMOL/L (ref 98–107)
CO2 SERPL-SCNC: 26 MMOL/L (ref 22–29)
COLOR UR: ABNORMAL
CREAT SERPL-MCNC: 0.83 MG/DL (ref 0.55–1.02)
ERYTHROCYTE [DISTWIDTH] IN BLOOD BY AUTOMATED COUNT: 12.8 % (ref 11.5–17)
GFR SERPLBLD CREATININE-BSD FMLA CKD-EPI: >60 MLS/MIN/1.73/M2
GLOBULIN SER-MCNC: 3.7 GM/DL (ref 2.4–3.5)
GLUCOSE SERPL-MCNC: 116 MG/DL (ref 74–100)
GLUCOSE UR QL STRIP.AUTO: NORMAL MG/DL
HCT VFR BLD AUTO: 39.9 % (ref 37–47)
HGB BLD-MCNC: 12.5 G/DL (ref 12–16)
HYALINE CASTS #/AREA URNS LPF: ABNORMAL /LPF
KETONES UR QL STRIP.AUTO: NEGATIVE MG/DL
LEUKOCYTE ESTERASE UR QL STRIP.AUTO: NEGATIVE UNIT/L
LIPASE SERPL-CCNC: 12 U/L
LYMPH ABN # BLD MANUAL: 1 %
LYMPHOCYTES NFR BLD MANUAL: 1.78 X10(3)/MCL
LYMPHOCYTES NFR BLD MANUAL: 28 % (ref 13–40)
MCH RBC QN AUTO: 28.2 PG (ref 27–31)
MCHC RBC AUTO-ENTMCNC: 31.3 G/DL (ref 33–36)
MCV RBC AUTO: 89.9 FL (ref 80–94)
MONOCYTES NFR BLD MANUAL: 0.51 X10(3)/MCL (ref 0.1–1.3)
MONOCYTES NFR BLD MANUAL: 8 % (ref 2–11)
MUCOUS THREADS URNS QL MICRO: ABNORMAL /LPF
NEUTROPHILS NFR BLD MANUAL: 62 % (ref 47–80)
NITRITE UR QL STRIP.AUTO: NEGATIVE
NRBC BLD AUTO-RTO: 0 %
PH UR STRIP.AUTO: 5.5 [PH]
PLATELET # BLD AUTO: 243 X10(3)/MCL (ref 130–400)
PMV BLD AUTO: 10 FL (ref 7.4–10.4)
POTASSIUM SERPL-SCNC: 3.5 MMOL/L (ref 3.5–5.1)
PROT SERPL-MCNC: 7.9 GM/DL (ref 6.4–8.3)
PROT UR QL STRIP.AUTO: NEGATIVE MG/DL
RBC # BLD AUTO: 4.44 X10(6)/MCL (ref 4.2–5.4)
RBC #/AREA URNS AUTO: ABNORMAL /HPF
RBC UR QL AUTO: NEGATIVE UNIT/L
SODIUM SERPL-SCNC: 141 MMOL/L (ref 136–145)
SP GR UR STRIP.AUTO: 1.03
SQUAMOUS #/AREA URNS LPF: ABNORMAL /HPF
UROBILINOGEN UR STRIP-ACNC: NORMAL MG/DL
WBC # SPEC AUTO: 6.34 X10(3)/MCL (ref 4.5–11.5)
WBC #/AREA URNS AUTO: ABNORMAL /HPF

## 2023-05-18 PROCEDURE — 96374 THER/PROPH/DIAG INJ IV PUSH: CPT | Mod: 59

## 2023-05-18 PROCEDURE — 93005 ELECTROCARDIOGRAM TRACING: CPT

## 2023-05-18 PROCEDURE — 81001 URINALYSIS AUTO W/SCOPE: CPT | Performed by: FAMILY MEDICINE

## 2023-05-18 PROCEDURE — 80053 COMPREHEN METABOLIC PANEL: CPT | Performed by: FAMILY MEDICINE

## 2023-05-18 PROCEDURE — 96361 HYDRATE IV INFUSION ADD-ON: CPT

## 2023-05-18 PROCEDURE — 83690 ASSAY OF LIPASE: CPT | Performed by: FAMILY MEDICINE

## 2023-05-18 PROCEDURE — 0240U COVID/FLU A&B PCR: CPT | Performed by: FAMILY MEDICINE

## 2023-05-18 PROCEDURE — 25500020 PHARM REV CODE 255

## 2023-05-18 PROCEDURE — 85027 COMPLETE CBC AUTOMATED: CPT | Performed by: FAMILY MEDICINE

## 2023-05-18 PROCEDURE — 25000003 PHARM REV CODE 250: Performed by: FAMILY MEDICINE

## 2023-05-18 PROCEDURE — 63600175 PHARM REV CODE 636 W HCPCS: Performed by: FAMILY MEDICINE

## 2023-05-18 PROCEDURE — 99285 EMERGENCY DEPT VISIT HI MDM: CPT | Mod: 25

## 2023-05-18 PROCEDURE — 96375 TX/PRO/DX INJ NEW DRUG ADDON: CPT

## 2023-05-18 PROCEDURE — 74018 RADEX ABDOMEN 1 VIEW: CPT | Mod: TC

## 2023-05-18 RX ORDER — MORPHINE SULFATE 2 MG/ML
4 INJECTION, SOLUTION INTRAMUSCULAR; INTRAVENOUS
Status: COMPLETED | OUTPATIENT
Start: 2023-05-18 | End: 2023-05-18

## 2023-05-18 RX ORDER — ACETAMINOPHEN 325 MG/1
650 TABLET ORAL
Status: COMPLETED | OUTPATIENT
Start: 2023-05-18 | End: 2023-05-18

## 2023-05-18 RX ORDER — ONDANSETRON 2 MG/ML
4 INJECTION INTRAMUSCULAR; INTRAVENOUS
Status: COMPLETED | OUTPATIENT
Start: 2023-05-18 | End: 2023-05-18

## 2023-05-18 RX ADMIN — MORPHINE SULFATE 4 MG: 2 INJECTION, SOLUTION INTRAMUSCULAR; INTRAVENOUS at 10:05

## 2023-05-18 RX ADMIN — ONDANSETRON 4 MG: 2 INJECTION INTRAMUSCULAR; INTRAVENOUS at 10:05

## 2023-05-18 RX ADMIN — IOHEXOL 100 ML: 350 INJECTION, SOLUTION INTRAVENOUS at 11:05

## 2023-05-18 RX ADMIN — SODIUM CHLORIDE 1000 ML: 9 INJECTION, SOLUTION INTRAVENOUS at 10:05

## 2023-05-18 RX ADMIN — ACETAMINOPHEN 650 MG: 325 TABLET ORAL at 11:05

## 2023-05-19 VITALS
HEIGHT: 65 IN | BODY MASS INDEX: 25.9 KG/M2 | DIASTOLIC BLOOD PRESSURE: 78 MMHG | RESPIRATION RATE: 18 BRPM | SYSTOLIC BLOOD PRESSURE: 119 MMHG | OXYGEN SATURATION: 98 % | TEMPERATURE: 99 F | WEIGHT: 155.44 LBS | HEART RATE: 83 BPM

## 2023-05-19 LAB
FLUAV AG UPPER RESP QL IA.RAPID: NOT DETECTED
FLUBV AG UPPER RESP QL IA.RAPID: NOT DETECTED
SARS-COV-2 RNA RESP QL NAA+PROBE: NOT DETECTED

## 2023-05-19 PROCEDURE — 96361 HYDRATE IV INFUSION ADD-ON: CPT

## 2023-05-19 RX ORDER — DOCUSATE SODIUM 100 MG/1
100 CAPSULE, LIQUID FILLED ORAL 2 TIMES DAILY PRN
Qty: 20 CAPSULE | Refills: 0 | Status: SHIPPED | OUTPATIENT
Start: 2023-05-19 | End: 2023-05-29

## 2023-05-19 RX ORDER — ONDANSETRON 4 MG/1
4 TABLET, ORALLY DISINTEGRATING ORAL EVERY 6 HOURS PRN
Qty: 10 TABLET | Refills: 0 | Status: SHIPPED | OUTPATIENT
Start: 2023-05-19 | End: 2023-05-24

## 2023-05-19 RX ORDER — DICLOFENAC SODIUM 75 MG/1
75 TABLET, DELAYED RELEASE ORAL 2 TIMES DAILY PRN
Qty: 20 TABLET | Refills: 0 | Status: SHIPPED | OUTPATIENT
Start: 2023-05-19 | End: 2023-05-29

## 2023-05-19 NOTE — ED NOTES
Patient here with abd pain and nausea. Concern for bowel obstruction. States has had bowel obstruction in past treated with NG tube. Pain now 10/10.Has passed gas. Has had a bowel mvmt today. Could not eat evening meal due to nausea and pain.

## 2023-05-19 NOTE — ED PROVIDER NOTES
Encounter Date: 2023       History     Chief Complaint   Patient presents with    Abdominal Pain     C/o abdominal pain, nausea x 3 days but worsening. Hx of intestinal obstruction 1 month ago     Patient is a 58-year-old female with a history of colon cancer in the past status post resection presents emergency room with complaints of epigastric and periumbilical abdominal pain that has been ongoing for the last 3 days.  Patient reports associated shortness of breath.  Patient reports having nausea chills and a headache for the past 3 days but worsening symptoms today.  Patient reports having a normal bowel movement earlier today.  When symptoms did not improve, she came to the emergency room for evaluation.  Patient reports nausea without vomiting.    The history is provided by the patient.   Review of patient's allergies indicates:   Allergen Reactions    Codeine Itching    Latex, natural rubber      Past Medical History:   Diagnosis Date    Depression     History of colon cancer     Hypertension      Past Surgical History:   Procedure Laterality Date    APPENDECTOMY       SECTION      COLON RESECTION      HERNIA REPAIR       Family History   Problem Relation Age of Onset    Colon cancer Father      Social History     Tobacco Use    Smoking status: Never    Smokeless tobacco: Never   Substance Use Topics    Alcohol use: Not Currently    Drug use: Never     Review of Systems   Constitutional:  Positive for chills, fatigue and fever.   HENT:  Negative for ear pain, rhinorrhea and sore throat.    Eyes:  Negative for photophobia and pain.   Respiratory:  Negative for cough, shortness of breath and wheezing.    Cardiovascular:  Negative for chest pain.   Gastrointestinal:  Positive for abdominal pain and nausea. Negative for diarrhea and vomiting.   Genitourinary:  Negative for dysuria.   Neurological:  Negative for dizziness, weakness and headaches.   All other systems reviewed and are  negative.    Physical Exam     Initial Vitals [05/18/23 2154]   BP Pulse Resp Temp SpO2   (!) 145/103 82 20 (!) 100.9 °F (38.3 °C) (!) 94 %      MAP       --         Physical Exam    Nursing note and vitals reviewed.  Constitutional: She appears well-developed and well-nourished. No distress.   HENT:   Head: Normocephalic and atraumatic.   Eyes: Conjunctivae and EOM are normal. Pupils are equal, round, and reactive to light.   Neck: Neck supple.   Normal range of motion.  Cardiovascular:  Normal rate, regular rhythm, normal heart sounds and intact distal pulses.           Pulmonary/Chest: Breath sounds normal. No respiratory distress. She has no wheezes. She has no rhonchi. She has no rales.   Abdominal: Abdomen is soft. Bowel sounds are normal. She exhibits no distension. There is abdominal tenderness.   Mild generalized abdominal tenderness without rebound or guarding. There is no rebound and no guarding.   Musculoskeletal:         General: Normal range of motion.      Cervical back: Normal range of motion and neck supple.     Neurological: She is alert and oriented to person, place, and time.   Skin: Skin is warm and dry. Capillary refill takes less than 2 seconds. No erythema.   Psychiatric: She has a normal mood and affect. Her behavior is normal. Judgment and thought content normal.     ED Course   Procedures  Labs Reviewed   COMPREHENSIVE METABOLIC PANEL - Abnormal; Notable for the following components:       Result Value    Glucose Level 116 (*)     Blood Urea Nitrogen 22.8 (*)     Globulin 3.7 (*)     All other components within normal limits   URINALYSIS, REFLEX TO URINE CULTURE - Abnormal; Notable for the following components:    Squamous Epithelial Cells, UA Trace (*)     Mucous, UA Trace (*)     All other components within normal limits   CBC WITH DIFFERENTIAL - Abnormal; Notable for the following components:    MCHC 31.3 (*)     All other components within normal limits   LIPASE - Normal   COVID/FLU  A&B PCR - Normal    Narrative:     The Xpert Xpress SARS-CoV-2/FLU/RSV plus is a rapid, multiplexed real-time PCR test intended for the simultaneous qualitative detection and differentiation of SARS-CoV-2, Influenza A, Influenza B, and respiratory syncytial virus (RSV) viral RNA in either nasopharyngeal swab or nasal swab specimens.         CBC W/ AUTO DIFFERENTIAL    Narrative:     The following orders were created for panel order CBC Auto Differential.  Procedure                               Abnormality         Status                     ---------                               -----------         ------                     CBC with Differential[[id number]]        Abnormal            Final result               Manual Differential[[id number]]                              Final result                 Please view results for these tests on the individual orders.   MANUAL DIFFERENTIAL   EXTRA TUBES    Narrative:     The following orders were created for panel order EXTRA TUBES.  Procedure                               Abnormality         Status                     ---------                               -----------         ------                     Light Blue Top Hold[[id number]]                              In process                 Gold Top Hold[[id number]]                                    In process                 Pink Top Hold[[id number]]                                    In process                   Please view results for these tests on the individual orders.   LIGHT BLUE TOP HOLD   GOLD TOP HOLD   PINK TOP HOLD     EKG Readings: (Independently Interpreted)   My Independent EKG Interpretation  05/18/2023 11:07 PM  Rate: 85 bpm  Rhythm: Sinus  Axis: Normal  Intervals: Normal  ST Changes: Nonspecific  Impression: Normal sinus rhythm with nonspecific ST changes         Imaging Results              CT Abdomen Pelvis With Contrast (Preliminary result)  Result time 05/18/23 23:22:33      Preliminary result by Joseph  ROBERTO Meyer Jr., MD (05/18/23 23:22:33)                   Narrative:    START OF REPORT:  TECHNIQUE: CT OF THE ABDOMEN AND PELVIS WAS PERFORMED WITH AXIAL IMAGES AS WELL AS SAGITTAL AND CORONAL RECONSTRUCTION IMAGES WITH INTRAVENOUS CONTRAST.    COMPARISON: COMPARISON IS WITH STUDY [id number] 19:35:50.    CLINICAL HISTORY: ABDOMINAL PAIN (C/O ABDOMINAL PAIN, NAUSEA X 3 DAYS BUT WORSENING. HX OF INTESTINAL OBSTRUCTION 1 MONTH AGO).    DOSAGE INFORMATION: AUTOMATED EXPOSURE CONTROL WAS UTILIZED.    Findings:  Thorax:  Lungs: There is mild nonspecific dependent change at the lung bases.  Pleura: No effusions or thickening.  Heart: The heart size is within normal limits.  Abdomen:  Abdominal Wall: No abdominal wall pathology is seen.  Liver: The liver appears unremarkable.  Biliary System: No extrahepatic biliary duct dilatation is seen.  Gallbladder: The gallbladder appears unremarkable.  Pancreas: The pancreas appears unremarkable.  Spleen: The spleen appears unremarkable.  Adrenals: The adrenal glands appear unremarkable.  Kidneys: The kidneys appear unremarkable with no stones cysts masses or hydronephrosis.  Aorta: The abdominal aorta appears unremarkable.  IVC: Unremarkable.  Bowel:  Esophagus: The visualized esophagus appears unremarkable.  Stomach: The stomach appears unremarkable.  Duodenum: Unremarkable appearing duodenum.  Small Bowel: The small bowel appears unremarkable.  Colon: Nondistended.  Appendix: The appendix is not identified but no inflammatory changes are seen in the right lower quadrant to suggest appendicitis.  Peritoneum: No intraperitoneal free air or ascites is seen.    Pelvis:  Bladder: The bladder appears unremarkable.  Female:  Uterus: There is a stable appearing 3.8 cm enhancing lesion at the fundus of the uterus (series 2, image 115). There are also focal calcified lesions in the body of the uterus. These likely reflect fibroids.  Ovaries: No adnexal masses are seen.    Bony  structures:  Dorsal Spine: There is mild spondylosis of the visualized dorsal spine.  Bony Pelvis: The visualized bony structures of the pelvis appear unremarkable.      Impression:  1. There is a stable appearing 3.8 cm enhancing lesion at the fundus of the uterus (series 2, image 115). There are also focal calcified lesions in the body of the uterus. These likely reflect fibroids.  2. No acute intraabdominal or pelvic pathology is identified. Details and findings as discussed.                                         X-Ray Chest 1 View (Preliminary result)  Result time 05/18/23 23:41:55      Wet Read by Zeeshan Naylor MD (05/18/23 23:41:55, [facility], Emergency Medicine)    No acute abnormality appreciated.                                     Medications   sodium chloride 0.9% bolus 1,000 mL 1,000 mL (0 mLs Intravenous Stopped 5/19/23 0108)   ondansetron injection 4 mg (4 mg Intravenous Given 5/18/23 2231)   morphine injection 4 mg (4 mg Intravenous Given 5/18/23 2231)   acetaminophen tablet 650 mg (650 mg Oral Given 5/18/23 2313)   iohexoL (OMNIPAQUE 350) 350 mg iodine/mL injection (100 mLs Intravenous Given 5/18/23 2315)     Medical Decision Making:   Initial Assessment:   Patient is a 58-year-old female presents emergency room complaints of abdominal pain, nausea without vomiting, fevers.  History of colon cancer in the past.  Will obtain laboratory evaluation including CBC and CMP.  Will also obtain a CT of the abdomen pelvis for further evaluation.  Will also obtain a chest x-ray due to the slight shortness of breath along with an EKG.  Will continue to monitor.           ED Course as of 05/19/23 0116   Thu May 18, 2023   2224 Bacteria, UA: None Seen [MW]   2224 Color, UA: Light-Yellow [MW]   2224 Appearance, UA: Clear [MW]   2224 Specific Gravity,UA: 1.027 [MW]   2224 pH, UA: 5.5 [MW]   2224 Protein, UA: Negative [MW]   2224 Glucose, UA: Normal [MW]   2224 Ketones, UA:  Negative [MW]   2224 Occult Blood UA: Negative [MW]   2224 Bilirubin, UA: Negative [MW]   2224 Urobilinogen, UA: Normal [MW]   2224 NITRITE UA: Negative [MW]   2224 Leukocytes, UA: Negative [MW]   2307 WBC: 6.34 [MW]   2307 Hemoglobin: 12.5 [MW]   2307 Hematocrit: 39.9 [MW]   2307 Platelets: 243 [MW]   2307 Sodium: 141 [MW]   2307 Potassium: 3.5 [MW]   2307 Chloride: 104 [MW]   2307 CO2: 26 [MW]   2307 Glucose(!): 116 [MW]   2307 BUN(!): 22.8 [MW]   2307 Creatinine: 0.83 [MW]   2307 Calcium: 9.4 [MW]   2307 Albumin: 4.2 [MW]   2307 ALT: 14 [MW]   2307 AST: 16 [MW]   2307 Alkaline Phosphatase: 74 [MW]   Fri May 19, 2023   0100 Influenza A, Molecular: Not Detected [MW]   0103 Influenza B, Molecular: Not Detected [MW]   0103 SARS-CoV2 (COVID-19) Qualitative PCR: Not Detected [MW]      ED Course User Index  [MW] Zeeshan Naylor MD                 Clinical Impression:   Final diagnoses:  [R06.00] Dyspnea  [R10.32] Left lower quadrant abdominal pain (Primary)        ED Disposition Condition    Discharge Stable          ED Prescriptions       Medication Sig Dispense Start Date End Date Auth. Provider    ondansetron (ZOFRAN-ODT) 4 MG TbDL Take 1 tablet (4 mg total) by mouth every 6 (six) hours as needed (nausea vomiting). 10 tablet 5/19/2023 5/24/2023 Zeeshan Naylor MD    docusate sodium (COLACE) 100 MG capsule Take 1 capsule (100 mg total) by mouth 2 (two) times daily as needed for Constipation. 20 capsule 5/19/2023 5/29/2023 Zeeshan Naylor MD    diclofenac (VOLTAREN) 75 MG EC tablet Take 1 tablet (75 mg total) by mouth 2 (two) times daily as needed (Pain). 20 tablet 5/19/2023 5/29/2023 Zeeshan Naylor MD          Follow-up Information       Follow up With Specialties Details Why Contact Info    Keisha Torres MD Family Medicine   [address] [place] [address]  [phone]      [facility] Emergency Medicine  As needed, If symptoms worsen [address] [facility]  [place] [address]  [phone]             Zeeshan Naylor MD  05/19/23 0116

## 2024-05-10 ENCOUNTER — HOSPITAL ENCOUNTER (OUTPATIENT)
Dept: RADIOLOGY | Facility: HOSPITAL | Age: 59
Discharge: HOME | End: 2024-05-10
Attending: PEDIATRICS
Payer: COMMERCIAL

## 2024-05-10 DIAGNOSIS — Z12.31 ENCOUNTER FOR SCREENING MAMMOGRAM FOR BREAST CANCER: ICD-10-CM

## 2024-05-10 PROCEDURE — 77067 SCR MAMMO BI INCL CAD: CPT | Mod: 26,,, | Performed by: STUDENT IN AN ORGANIZED HEALTH CARE EDUCATION/TRAINING PROGRAM

## 2024-05-10 PROCEDURE — 77063 BREAST TOMOSYNTHESIS BI: CPT | Mod: 26,,, | Performed by: STUDENT IN AN ORGANIZED HEALTH CARE EDUCATION/TRAINING PROGRAM

## 2024-05-10 PROCEDURE — 77067 SCR MAMMO BI INCL CAD: CPT | Mod: TC

## 2025-04-26 ENCOUNTER — HOSPITAL ENCOUNTER (EMERGENCY)
Facility: HOSPITAL | Age: 60
Discharge: HOME | End: 2025-04-26
Attending: INTERNAL MEDICINE
Payer: COMMERCIAL

## 2025-04-26 VITALS
HEIGHT: 65 IN | SYSTOLIC BLOOD PRESSURE: 131 MMHG | OXYGEN SATURATION: 98 % | DIASTOLIC BLOOD PRESSURE: 73 MMHG | HEART RATE: 60 BPM | TEMPERATURE: 99 F | WEIGHT: 151 LBS | BODY MASS INDEX: 25.16 KG/M2 | RESPIRATION RATE: 18 BRPM

## 2025-04-26 DIAGNOSIS — K21.9 GASTROESOPHAGEAL REFLUX DISEASE, UNSPECIFIED WHETHER ESOPHAGITIS PRESENT: Primary | ICD-10-CM

## 2025-04-26 DIAGNOSIS — K59.00 CONSTIPATION, UNSPECIFIED CONSTIPATION TYPE: ICD-10-CM

## 2025-04-26 DIAGNOSIS — R10.9 ABDOMINAL PAIN: ICD-10-CM

## 2025-04-26 LAB
ALBUMIN SERPL-MCNC: 3.7 G/DL (ref 3.5–5)
ALBUMIN/GLOB SERPL: 1.1 RATIO (ref 1.1–2)
ALP SERPL-CCNC: 56 UNIT/L (ref 40–150)
ALT SERPL-CCNC: 11 UNIT/L (ref 0–55)
ANION GAP SERPL CALC-SCNC: 8 MEQ/L
AST SERPL-CCNC: 13 UNIT/L (ref 11–45)
BACTERIA #/AREA URNS AUTO: ABNORMAL /HPF
BASOPHILS # BLD AUTO: 0.03 X10(3)/MCL
BASOPHILS NFR BLD AUTO: 0.9 %
BILIRUB SERPL-MCNC: 0.6 MG/DL
BILIRUB UR QL STRIP.AUTO: NEGATIVE
BUN SERPL-MCNC: 15 MG/DL (ref 9.8–20.1)
CALCIUM SERPL-MCNC: 9.5 MG/DL (ref 8.4–10.2)
CHLORIDE SERPL-SCNC: 108 MMOL/L (ref 98–107)
CLARITY UR: CLEAR
CO2 SERPL-SCNC: 26 MMOL/L (ref 22–29)
COLOR UR AUTO: YELLOW
CREAT SERPL-MCNC: 0.79 MG/DL (ref 0.55–1.02)
CREAT/UREA NIT SERPL: 19
EOSINOPHIL # BLD AUTO: 0.11 X10(3)/MCL (ref 0–0.9)
EOSINOPHIL NFR BLD AUTO: 3.3 %
ERYTHROCYTE [DISTWIDTH] IN BLOOD BY AUTOMATED COUNT: 12.8 % (ref 11.5–17)
GFR SERPLBLD CREATININE-BSD FMLA CKD-EPI: >60 ML/MIN/1.73/M2
GLOBULIN SER-MCNC: 3.4 GM/DL (ref 2.4–3.5)
GLUCOSE SERPL-MCNC: 91 MG/DL (ref 74–100)
GLUCOSE UR QL STRIP: NEGATIVE
HCT VFR BLD AUTO: 34.7 % (ref 37–47)
HGB BLD-MCNC: 11.1 G/DL (ref 12–16)
HGB UR QL STRIP: NEGATIVE
IMM GRANULOCYTES # BLD AUTO: 0.01 X10(3)/MCL (ref 0–0.04)
IMM GRANULOCYTES NFR BLD AUTO: 0.3 %
KETONES UR QL STRIP: NEGATIVE
LEUKOCYTE ESTERASE UR QL STRIP: NEGATIVE
LIPASE SERPL-CCNC: 7 U/L
LYMPHOCYTES # BLD AUTO: 1.29 X10(3)/MCL (ref 0.6–4.6)
LYMPHOCYTES NFR BLD AUTO: 39 %
MCH RBC QN AUTO: 28.2 PG (ref 27–31)
MCHC RBC AUTO-ENTMCNC: 32 G/DL (ref 33–36)
MCV RBC AUTO: 88.3 FL (ref 80–94)
MONOCYTES # BLD AUTO: 0.46 X10(3)/MCL (ref 0.1–1.3)
MONOCYTES NFR BLD AUTO: 13.9 %
MUCOUS THREADS URNS QL MICRO: ABNORMAL /LPF
NEUTROPHILS # BLD AUTO: 1.41 X10(3)/MCL (ref 2.1–9.2)
NEUTROPHILS NFR BLD AUTO: 42.6 %
NITRITE UR QL STRIP: NEGATIVE
NRBC BLD AUTO-RTO: 0 %
PH UR STRIP: 5.5 [PH]
PLATELET # BLD AUTO: 214 X10(3)/MCL (ref 130–400)
PMV BLD AUTO: 9.8 FL (ref 7.4–10.4)
POTASSIUM SERPL-SCNC: 4.2 MMOL/L (ref 3.5–5.1)
PROT SERPL-MCNC: 7.1 GM/DL (ref 6.4–8.3)
PROT UR QL STRIP: ABNORMAL
RBC # BLD AUTO: 3.93 X10(6)/MCL (ref 4.2–5.4)
RBC #/AREA URNS AUTO: ABNORMAL /HPF
SODIUM SERPL-SCNC: 142 MMOL/L (ref 136–145)
SP GR UR STRIP.AUTO: 1.02 (ref 1–1.03)
SQUAMOUS #/AREA URNS AUTO: ABNORMAL /HPF
TROPONIN I SERPL-MCNC: <0.01 NG/ML (ref 0–0.04)
UROBILINOGEN UR STRIP-ACNC: 0.2
WBC # BLD AUTO: 3.31 X10(3)/MCL (ref 4.5–11.5)
WBC #/AREA URNS AUTO: ABNORMAL /HPF

## 2025-04-26 PROCEDURE — 99285 EMERGENCY DEPT VISIT HI MDM: CPT | Mod: 25

## 2025-04-26 PROCEDURE — 81003 URINALYSIS AUTO W/O SCOPE: CPT

## 2025-04-26 PROCEDURE — 83690 ASSAY OF LIPASE: CPT

## 2025-04-26 PROCEDURE — 96372 THER/PROPH/DIAG INJ SC/IM: CPT

## 2025-04-26 PROCEDURE — 85025 COMPLETE CBC W/AUTO DIFF WBC: CPT

## 2025-04-26 PROCEDURE — 25000003 PHARM REV CODE 250

## 2025-04-26 PROCEDURE — 80053 COMPREHEN METABOLIC PANEL: CPT

## 2025-04-26 PROCEDURE — 63600175 PHARM REV CODE 636 W HCPCS

## 2025-04-26 PROCEDURE — 84484 ASSAY OF TROPONIN QUANT: CPT

## 2025-04-26 RX ORDER — HYDROMORPHONE HYDROCHLORIDE 2 MG/ML
1 INJECTION, SOLUTION INTRAMUSCULAR; INTRAVENOUS; SUBCUTANEOUS
Refills: 0 | Status: COMPLETED | OUTPATIENT
Start: 2025-04-26 | End: 2025-04-26

## 2025-04-26 RX ORDER — PANTOPRAZOLE SODIUM 20 MG/1
20 TABLET, DELAYED RELEASE ORAL DAILY
Qty: 90 TABLET | Refills: 3 | Status: SHIPPED | OUTPATIENT
Start: 2025-04-26 | End: 2026-04-26

## 2025-04-26 RX ORDER — LIDOCAINE HYDROCHLORIDE 20 MG/ML
15 SOLUTION OROPHARYNGEAL ONCE
Status: COMPLETED | OUTPATIENT
Start: 2025-04-26 | End: 2025-04-26

## 2025-04-26 RX ORDER — ONDANSETRON HYDROCHLORIDE 2 MG/ML
8 INJECTION, SOLUTION INTRAVENOUS
Status: COMPLETED | OUTPATIENT
Start: 2025-04-26 | End: 2025-04-26

## 2025-04-26 RX ORDER — DOCUSATE SODIUM 100 MG/1
100 CAPSULE, LIQUID FILLED ORAL 2 TIMES DAILY PRN
Qty: 60 CAPSULE | Refills: 0 | Status: SHIPPED | OUTPATIENT
Start: 2025-04-26

## 2025-04-26 RX ORDER — ALUMINUM HYDROXIDE, MAGNESIUM HYDROXIDE, AND SIMETHICONE 1200; 120; 1200 MG/30ML; MG/30ML; MG/30ML
30 SUSPENSION ORAL ONCE
Status: COMPLETED | OUTPATIENT
Start: 2025-04-26 | End: 2025-04-26

## 2025-04-26 RX ORDER — ONDANSETRON 4 MG/1
8 TABLET, FILM COATED ORAL EVERY 8 HOURS PRN
Qty: 60 TABLET | Refills: 0 | Status: SHIPPED | OUTPATIENT
Start: 2025-04-26 | End: 2025-05-06

## 2025-04-26 RX ADMIN — HYDROMORPHONE HYDROCHLORIDE 1 MG: 2 INJECTION INTRAMUSCULAR; INTRAVENOUS; SUBCUTANEOUS at 03:04

## 2025-04-26 RX ADMIN — ONDANSETRON 8 MG: 2 INJECTION INTRAMUSCULAR; INTRAVENOUS at 02:04

## 2025-04-26 RX ADMIN — LIDOCAINE HYDROCHLORIDE 15 ML: 20 SOLUTION ORAL at 02:04

## 2025-04-26 RX ADMIN — ALUMINUM HYDROXIDE, MAGNESIUM HYDROXIDE, AND SIMETHICONE 30 ML: 200; 200; 20 SUSPENSION ORAL at 02:04

## 2025-04-26 NOTE — ED PROVIDER NOTES
Encounter Date: 2025       History     Chief Complaint   Patient presents with    Abdominal Pain     C/o abd cramping and burning since yesterday. Seen PCP yesterday     See mdm    The history is provided by the patient.     Review of patient's allergies indicates:   Allergen Reactions    Codeine Itching    Latex, natural rubber      Past Medical History:   Diagnosis Date    Depression     History of colon cancer     Hypertension      Past Surgical History:   Procedure Laterality Date    APPENDECTOMY       SECTION      COLON RESECTION      HERNIA REPAIR       Family History   Problem Relation Name Age of Onset    Colon cancer Father       Social History[1]  Review of Systems   Cardiovascular:  Positive for chest pain.   Gastrointestinal:  Positive for abdominal pain and nausea.   All other systems reviewed and are negative.      Physical Exam     Initial Vitals [25 1325]   BP Pulse Resp Temp SpO2   (!) 181/107 69 20 98.8 °F (37.1 °C) 99 %      MAP       --         Physical Exam    Nursing note and vitals reviewed.  Constitutional: She appears well-developed. She is cooperative. She appears ill.   HENT:   Head: Normocephalic.   Right Ear: External ear normal.   Left Ear: External ear normal.   Nose: Nose normal. Mouth/Throat: Oropharynx is clear and moist.   Eyes: EOM are normal. Pupils are equal, round, and reactive to light.   Neck:   Normal range of motion.  Cardiovascular:  Normal rate.           Pulmonary/Chest: Breath sounds normal. No respiratory distress.   Abdominal: Abdomen is soft. Bowel sounds are decreased. There is abdominal tenderness in the epigastric area.   No right CVA tenderness.  No left CVA tenderness. There is no guarding, no tenderness at McBurney's point and negative Mansfield's sign.   Musculoskeletal:         General: Normal range of motion.      Cervical back: Normal range of motion.     Neurological: She is alert and oriented to person, place, and time. GCS score is 15.  GCS eye subscore is 4. GCS verbal subscore is 5. GCS motor subscore is 6.   Skin: Skin is warm. Capillary refill takes less than 2 seconds.   Psychiatric: She has a normal mood and affect.         ED Course   Procedures  Labs Reviewed   COMPREHENSIVE METABOLIC PANEL - Abnormal       Result Value    Sodium 142      Potassium 4.2      Chloride 108 (*)     CO2 26      Glucose 91      Blood Urea Nitrogen 15.0      Creatinine 0.79      Calcium 9.5      Protein Total 7.1      Albumin 3.7      Globulin 3.4      Albumin/Globulin Ratio 1.1      Bilirubin Total 0.6      ALP 56      ALT 11      AST 13      eGFR >60      Anion Gap 8.0      BUN/Creatinine Ratio 19     URINALYSIS - Abnormal    Color, UA Yellow      Appearance, UA Clear      Specific Gravity, UA 1.025      pH, UA 5.5      Protein, UA 1+ (*)     Glucose, UA Negative      Ketones, UA Negative      Blood, UA Negative      Bilirubin, UA Negative      Urobilinogen, UA 0.2      Nitrites, UA Negative      Leukocyte Esterase, UA Negative     CBC WITH DIFFERENTIAL - Abnormal    WBC 3.31 (*)     RBC 3.93 (*)     Hgb 11.1 (*)     Hct 34.7 (*)     MCV 88.3      MCH 28.2      MCHC 32.0 (*)     RDW 12.8      Platelet 214      MPV 9.8      Neut % 42.6      Lymph % 39.0      Mono % 13.9      Eos % 3.3      Basophil % 0.9      Imm Grans % 0.3      Neut # 1.41 (*)     Lymph # 1.29      Mono # 0.46      Eos # 0.11      Baso # 0.03      Imm Gran # 0.01      NRBC% 0.0     URINALYSIS, MICROSCOPIC - Abnormal    Bacteria, UA Few (*)     Mucous, UA Small (*)     RBC, UA None Seen      WBC, UA 0-5      Squamous Epithelial Cells, UA Few (*)    TROPONIN I - Normal    Troponin-I <0.010     LIPASE - Normal    Lipase Level 7     CBC W/ AUTO DIFFERENTIAL    Narrative:     The following orders were created for panel order CBC auto differential.  Procedure                               Abnormality         Status                     ---------                               -----------         ------                      CBC with Differential[[id number]]       Abnormal            Final result                 Please view results for these tests on the individual orders.          Imaging Results              CT Abdomen Pelvis  Without Contrast (Final result)  Result time 04/26/25 15:34:48   Procedure changed from CT Chest Abdomen Pelvis Without Contrast (XPD)     Final result by Brijesh Pineda MD (04/26/25 15:34:48)                   Impression:      No acute process of the abdomen pelvis.  Other secondary findings as noted.      Electronically signed by: Brijesh Pineda MD  Date:    04/26/2025  Time:    15:34               Narrative:    EXAMINATION:  CT ABDOMEN PELVIS WITHOUT CONTRAST    CLINICAL HISTORY:  Burning epgastric abdominal pain unrelieved by GI cocktail;    TECHNIQUE:  Multiple cross-section obtained from the lung bases the pubic symphysis without the use of contrast.  Coronal and sagittal reformatted images were obtained.  An automated dose exposure technique was utilized this limits radiation does the patient.    COMPARISON:  Same day    FINDINGS:  Lung bases are clear.  Heart size within normal limits.    Evaluation of hollow and solid viscera is limited secondary to technique    The liver, spleen, adrenals, kidneys, and pancreas are normal.  Gallbladder is present questionable biliary sludge.    Small hiatal hernia.  Small bowel is normal caliber.  Colon is also normal caliber with scattered colonic diverticula.  No adjacent inflammatory changes.  Moderate to large stool burden is identified throughout the colon.  Normal appendix.    Uterus is anteverted with dystrophic calcifications suggestive of degenerative fibroids.  No evidence for adnexal mass.  The bladder is under distended normal.  No free fluid in the pelvis.  The course and caliber of the abdominal is normal.  No free fluid in the pelvis.  No evidence for adenopathy.    No suspicious osseous lesions.  Soft tissues are grossly normal.   Postsurgical changes are identified of the anterior abdominal wall with prior laparotomy.                                       X-Ray Abdomen AP 1 View (KUB) (Final result)  Result time 04/26/25 14:45:32      Final result by Brijesh Pineda MD (04/26/25 14:45:32)                   Impression:      Nonobstructive bowel gas pattern with concern for constipation.      Electronically signed by: Brijesh Pineda MD  Date:    04/26/2025  Time:    14:45               Narrative:    EXAMINATION:  XR ABDOMEN AP 1 VIEW    CLINICAL HISTORY:  Unspecified abdominal pain    TECHNIQUE:  AP View(s) of the abdomen was performed.    COMPARISON:  None    FINDINGS:  Nonobstructive bowel gas pattern.  Large stool burden is identified throughout the colon especially the ascending colon.    No suspicious calcifications or masses.  Postsurgical changes are identified with multiple surgical clips are in    No suspicious osseous lesions.                                       Medications   aluminum-magnesium hydroxide-simethicone 200-200-20 mg/5 mL suspension 30 mL (30 mLs Oral Given 4/26/25 1401)     And   LIDOcaine viscous HCl 2% oral solution 15 mL (15 mLs Oral Given 4/26/25 1401)   ondansetron injection 8 mg (8 mg Intramuscular Given 4/26/25 1401)   HYDROmorphone (PF) injection 1 mg (1 mg Intramuscular Given 4/26/25 1523)     Medical Decision Making  60 year old patient presents to the ED c/o chest and epigastric abdominal pain that has been ongoing since yesterday. Patient states that pain is burning sensation. Patient states that she did present to PCP on yesterday and was prescribed medication but denies knowing medications she was prescribed but denies any relief. Patient states that LBM was today and normal. Denies any urinary concerns. Patient does admit to having a hx of colon cancer w/ partial colectomy.    Amount and/or Complexity of Data Reviewed  Labs: ordered. Decision-making details documented in ED Course.  Radiology:  ordered.    Risk  OTC drugs.  Prescription drug management.      Additional MDM:   Differential Diagnosis:   Symptom: Abdominal pain. <> The follow diagnoses were considered and will be evaluated: Ileus, Pancreatitis, Renal Neoplasm, Small Bowel Obstruction and Urinary Tract Infection.              ED Course as of 04/26/25 1557   Sat Apr 26, 2025   1542 BILIRUBIN TOTAL: 0.6 [DL]   1542 Bilirubin (UA): Negative [DL]   1542 Lipase: 7 [DL]   1542 Troponin I: <0.010 [DL]   1554 Patient evaluated. Negative UTI, Lipase and troponin negative. No sepsis. XR abdomen + for constipation. With GI cocktail and zofran patient still c/o abdominal. CT abdomen + for constipation, negative for any other acute abnormalities. Patient educated on results. Patient will be d/c with prononix, zofran, and docusate to treat symptoms. Patient educated to please f/u in 3 days with PCP. Patient educated to please return to ED with any worsening of symptoms.  [DL]      ED Course User Index  [DL] Nando Rushing NP                           Clinical Impression:  Final diagnoses:  [R10.9] Abdominal pain  [K21.9] Gastroesophageal reflux disease, unspecified whether esophagitis present (Primary)  [K59.00] Constipation, unspecified constipation type          ED Disposition Condition    Discharge Good          ED Prescriptions       Medication Sig Dispense Start Date End Date Auth. Provider    ondansetron (ZOFRAN) 4 MG tablet Take 2 tablets (8 mg total) by mouth every 8 (eight) hours as needed. 60 tablet 4/26/2025 5/6/2025 Nando Rushing NP    pantoprazole (PROTONIX) 20 MG tablet Take 1 tablet (20 mg total) by mouth once daily. 90 tablet 4/26/2025 4/26/2026 Nando Rushing NP    docusate sodium (COLACE) 100 MG capsule Take 1 capsule (100 mg total) by mouth 2 (two) times daily as needed for Constipation. 60 capsule 4/26/2025 -- Nando Rushing NP          Follow-up Information       Follow up With Specialties Details Why Contact Info    Keisha Torres  MD [facility] In 3 days  [address] [place] [address]  [phone]                   [1]   Social History  Tobacco Use    Smoking status: Never    Smokeless tobacco: Never   Substance Use Topics    Alcohol use: Not Currently    Drug use: Never        Nando Rushing NP  04/26/25 1557

## 2025-04-28 ENCOUNTER — LAB VISIT (OUTPATIENT)
Dept: LAB | Facility: HOSPITAL | Age: 60
End: 2025-04-28
Attending: PEDIATRICS
Payer: COMMERCIAL

## 2025-04-28 DIAGNOSIS — M60.9 MYOSITIS, UNSPECIFIED MYOSITIS TYPE, UNSPECIFIED SITE: ICD-10-CM

## 2025-04-28 DIAGNOSIS — E78.00 PURE HYPERCHOLESTEROLEMIA: Primary | ICD-10-CM

## 2025-04-28 DIAGNOSIS — E11.9 DIABETES MELLITUS WITHOUT COMPLICATION: ICD-10-CM

## 2025-04-28 LAB
25(OH)D3+25(OH)D2 SERPL-MCNC: 43 NG/ML (ref 30–80)
ALBUMIN SERPL-MCNC: 3.8 G/DL (ref 3.5–5)
ALBUMIN/GLOB SERPL: 1 RATIO (ref 1.1–2)
ALP SERPL-CCNC: 58 UNIT/L (ref 40–150)
ALT SERPL-CCNC: 9 UNIT/L (ref 0–55)
ANION GAP SERPL CALC-SCNC: 7 MEQ/L
AST SERPL-CCNC: 15 UNIT/L (ref 11–45)
BACTERIA #/AREA URNS AUTO: ABNORMAL /HPF
BASOPHILS # BLD AUTO: 0.02 X10(3)/MCL
BASOPHILS NFR BLD AUTO: 0.6 %
BILIRUB SERPL-MCNC: 0.4 MG/DL
BILIRUB UR QL STRIP.AUTO: NEGATIVE
BUN SERPL-MCNC: 12 MG/DL (ref 9.8–20.1)
CALCIUM SERPL-MCNC: 9.6 MG/DL (ref 8.4–10.2)
CHLORIDE SERPL-SCNC: 105 MMOL/L (ref 98–107)
CHOLEST SERPL-MCNC: 154 MG/DL
CHOLEST/HDLC SERPL: 2 {RATIO} (ref 0–5)
CLARITY UR: CLEAR
CO2 SERPL-SCNC: 31 MMOL/L (ref 22–29)
COLOR UR AUTO: YELLOW
CREAT SERPL-MCNC: 0.76 MG/DL (ref 0.55–1.02)
CREAT/UREA NIT SERPL: 16
EOSINOPHIL # BLD AUTO: 0.14 X10(3)/MCL (ref 0–0.9)
EOSINOPHIL NFR BLD AUTO: 4.2 %
ERYTHROCYTE [DISTWIDTH] IN BLOOD BY AUTOMATED COUNT: 13 % (ref 11.5–17)
EST. AVERAGE GLUCOSE BLD GHB EST-MCNC: 119.8 MG/DL
GFR SERPLBLD CREATININE-BSD FMLA CKD-EPI: >60 ML/MIN/1.73/M2
GLOBULIN SER-MCNC: 3.8 GM/DL (ref 2.4–3.5)
GLUCOSE SERPL-MCNC: 90 MG/DL (ref 74–100)
GLUCOSE UR QL STRIP: NEGATIVE
HBA1C MFR BLD: 5.8 %
HCT VFR BLD AUTO: 37.3 % (ref 37–47)
HDLC SERPL-MCNC: 66 MG/DL (ref 35–60)
HGB BLD-MCNC: 11.6 G/DL (ref 12–16)
HGB UR QL STRIP: NEGATIVE
IMM GRANULOCYTES # BLD AUTO: 0.01 X10(3)/MCL (ref 0–0.04)
IMM GRANULOCYTES NFR BLD AUTO: 0.3 %
KETONES UR QL STRIP: NEGATIVE
LDLC SERPL CALC-MCNC: 80 MG/DL (ref 50–140)
LEUKOCYTE ESTERASE UR QL STRIP: NEGATIVE
LYMPHOCYTES # BLD AUTO: 1.05 X10(3)/MCL (ref 0.6–4.6)
LYMPHOCYTES NFR BLD AUTO: 31.5 %
MCH RBC QN AUTO: 27.9 PG (ref 27–31)
MCHC RBC AUTO-ENTMCNC: 31.1 G/DL (ref 33–36)
MCV RBC AUTO: 89.7 FL (ref 80–94)
MONOCYTES # BLD AUTO: 0.32 X10(3)/MCL (ref 0.1–1.3)
MONOCYTES NFR BLD AUTO: 9.6 %
MUCOUS THREADS URNS QL MICRO: ABNORMAL /LPF
NEUTROPHILS # BLD AUTO: 1.79 X10(3)/MCL (ref 2.1–9.2)
NEUTROPHILS NFR BLD AUTO: 53.8 %
NITRITE UR QL STRIP: NEGATIVE
NRBC BLD AUTO-RTO: 0 %
PH UR STRIP: 8.5 [PH]
PLATELET # BLD AUTO: 218 X10(3)/MCL (ref 130–400)
PMV BLD AUTO: 9.9 FL (ref 7.4–10.4)
POTASSIUM SERPL-SCNC: 4.4 MMOL/L (ref 3.5–5.1)
PROT SERPL-MCNC: 7.6 GM/DL (ref 6.4–8.3)
PROT UR QL STRIP: ABNORMAL
RBC # BLD AUTO: 4.16 X10(6)/MCL (ref 4.2–5.4)
RBC #/AREA URNS AUTO: ABNORMAL /HPF
SODIUM SERPL-SCNC: 143 MMOL/L (ref 136–145)
SP GR UR STRIP.AUTO: 1.02 (ref 1–1.03)
SQUAMOUS #/AREA URNS AUTO: ABNORMAL /HPF
T4 FREE SERPL-MCNC: 1.03 NG/DL (ref 0.7–1.48)
TRIGL SERPL-MCNC: 41 MG/DL (ref 37–140)
TSH SERPL-ACNC: 1.24 UIU/ML (ref 0.35–4.94)
UROBILINOGEN UR STRIP-ACNC: 0.2
VLDLC SERPL CALC-MCNC: 8 MG/DL
WBC # BLD AUTO: 3.33 X10(3)/MCL (ref 4.5–11.5)
WBC #/AREA URNS AUTO: ABNORMAL /HPF

## 2025-04-28 PROCEDURE — 82306 VITAMIN D 25 HYDROXY: CPT

## 2025-04-28 PROCEDURE — 80053 COMPREHEN METABOLIC PANEL: CPT

## 2025-04-28 PROCEDURE — 84439 ASSAY OF FREE THYROXINE: CPT

## 2025-04-28 PROCEDURE — 81003 URINALYSIS AUTO W/O SCOPE: CPT

## 2025-04-28 PROCEDURE — 83036 HEMOGLOBIN GLYCOSYLATED A1C: CPT

## 2025-04-28 PROCEDURE — 84443 ASSAY THYROID STIM HORMONE: CPT

## 2025-04-28 PROCEDURE — 36415 COLL VENOUS BLD VENIPUNCTURE: CPT

## 2025-04-28 PROCEDURE — 85025 COMPLETE CBC W/AUTO DIFF WBC: CPT

## 2025-04-28 PROCEDURE — 80061 LIPID PANEL: CPT

## 2025-05-22 ENCOUNTER — HOSPITAL ENCOUNTER (OUTPATIENT)
Dept: RADIOLOGY | Facility: HOSPITAL | Age: 60
Discharge: HOME | End: 2025-05-22
Attending: PEDIATRICS
Payer: COMMERCIAL

## 2025-05-22 DIAGNOSIS — R05.9 COUGH: ICD-10-CM

## 2025-05-22 DIAGNOSIS — Z12.31 ENCOUNTER FOR SCREENING MAMMOGRAM FOR BREAST CANCER: ICD-10-CM

## 2025-05-22 PROCEDURE — 77063 BREAST TOMOSYNTHESIS BI: CPT | Mod: TC

## 2025-05-22 PROCEDURE — 71046 X-RAY EXAM CHEST 2 VIEWS: CPT | Mod: TC
